# Patient Record
Sex: MALE | Race: WHITE | NOT HISPANIC OR LATINO | Employment: OTHER | URBAN - METROPOLITAN AREA
[De-identification: names, ages, dates, MRNs, and addresses within clinical notes are randomized per-mention and may not be internally consistent; named-entity substitution may affect disease eponyms.]

---

## 2019-10-04 LAB
APPEARANCE, UA: ABNORMAL
BACTERIA #/AREA URNS AUTO: ABNORMAL /HPF
BILIRUB UR QL STRIP: NEGATIVE
BUN SERPL-MCNC: 31 MG/DL (ref 7–18)
CALCIUM SERPL-MCNC: 9.2 MG/DL (ref 8.5–10.1)
CHLORIDE SERPL-SCNC: 102 MMOL/L (ref 98–107)
CO2 SERPL-SCNC: 29 MMOL/L (ref 21–32)
COLOR UR: YELLOW
CREAT SERPL-MCNC: 1.69 MG/DL (ref 0.7–1.3)
CREAT/UREA NIT SERPL: 18.3
ERYTHROCYTE [DISTWIDTH] IN BLOOD BY AUTOMATED COUNT: 13.4 % (ref 11.5–17)
GLUCOSE (UA): NEGATIVE
GLUCOSE SERPL-MCNC: 194 MG/DL (ref 74–106)
HCT VFR BLD AUTO: 36.4 % (ref 42–52)
HGB BLD-MCNC: 11.9 GM/DL (ref 14–18)
HGB UR QL STRIP: ABNORMAL
KETONES UR QL STRIP: ABNORMAL
LEUKOCYTE ESTERASE UR QL STRIP: ABNORMAL
MCH RBC QN AUTO: 30.5 PG (ref 27–31)
MCHC RBC AUTO-ENTMCNC: 32.7 GM/DL (ref 33–36)
MCV RBC AUTO: 93.3 FL (ref 80–94)
NITRITE UR QL STRIP.AUTO: NEGATIVE
PH UR STRIP: 5.5 [PH] (ref 5–9)
PLATELET # BLD AUTO: 188 X10(3)/MCL (ref 130–400)
PMV BLD AUTO: 9.7 FL (ref 9.4–12.4)
POTASSIUM SERPL-SCNC: 4.5 MMOL/L (ref 3.5–5.1)
PROT UR QL STRIP: ABNORMAL
RBC # BLD AUTO: 3.9 X10(6)/MCL (ref 4.7–6.1)
RBC #/AREA URNS HPF: 493 /HPF (ref 0–2)
SODIUM SERPL-SCNC: 138 MMOL/L (ref 136–145)
SP GR UR STRIP: 1.02 (ref 1–1.03)
SQUAMOUS EPITHELIAL, UA: ABNORMAL
UROBILINOGEN UR STRIP-ACNC: 0.2
WBC # SPEC AUTO: 4.9 X10(3)/MCL (ref 4.5–11.5)
WBC #/AREA URNS AUTO: 108 /HPF (ref 0–3)

## 2019-10-14 ENCOUNTER — HISTORICAL (OUTPATIENT)
Dept: ADMINISTRATIVE | Facility: HOSPITAL | Age: 79
End: 2019-10-14

## 2019-10-14 LAB
INR PPP: 1.1 (ref 0–1.3)
PROTHROMBIN TIME: 13.9 SECOND(S) (ref 12–14)

## 2020-05-25 LAB
APTT PPP: 30.8 SECOND(S) (ref 23.2–33.7)
BUN SERPL-MCNC: 28.3 MG/DL (ref 8.4–25.7)
CALCIUM SERPL-MCNC: 8.3 MG/DL (ref 8.8–10)
CHLORIDE SERPL-SCNC: 104 MMOL/L (ref 98–107)
CO2 SERPL-SCNC: 28 MMOL/L (ref 23–31)
CREAT SERPL-MCNC: 1.82 MG/DL (ref 0.73–1.18)
CREAT/UREA NIT SERPL: 16
ERYTHROCYTE [DISTWIDTH] IN BLOOD BY AUTOMATED COUNT: 13 % (ref 11.5–17)
GLUCOSE SERPL-MCNC: 144 MG/DL (ref 82–115)
HCT VFR BLD AUTO: 36.2 % (ref 42–52)
HGB BLD-MCNC: 11.6 GM/DL (ref 14–18)
INR PPP: 1.7 (ref 0–1.3)
MCH RBC QN AUTO: 30 PG (ref 27–31)
MCHC RBC AUTO-ENTMCNC: 32 GM/DL (ref 33–36)
MCV RBC AUTO: 93.5 FL (ref 80–94)
PLATELET # BLD AUTO: 158 X10(3)/MCL (ref 130–400)
PMV BLD AUTO: 9.4 FL (ref 9.4–12.4)
POTASSIUM SERPL-SCNC: 4.5 MMOL/L (ref 3.5–5.1)
PROTHROMBIN TIME: 19.6 SECOND(S) (ref 11.1–13.7)
RBC # BLD AUTO: 3.87 X10(6)/MCL (ref 4.7–6.1)
SARS-COV-2 RNA RESP QL NAA+PROBE: NOT DETECTED
SODIUM SERPL-SCNC: 138 MMOL/L (ref 136–145)
WBC # SPEC AUTO: 4.2 X10(3)/MCL (ref 4.5–11.5)

## 2020-05-28 ENCOUNTER — HOSPITAL ENCOUNTER (OUTPATIENT)
Dept: MEDSURG UNIT | Facility: HOSPITAL | Age: 80
End: 2020-05-29
Attending: UROLOGY | Admitting: UROLOGY

## 2020-05-28 LAB — GROUP & RH: NORMAL

## 2020-05-29 LAB
ABS NEUT (OLG): 8.31 X10(3)/MCL (ref 2.1–9.2)
BASOPHILS # BLD AUTO: 0 X10(3)/MCL (ref 0–0.2)
BASOPHILS NFR BLD AUTO: 0 %
BUN SERPL-MCNC: 14.5 MG/DL (ref 8.4–25.7)
CALCIUM SERPL-MCNC: 8.4 MG/DL (ref 8.8–10)
CHLORIDE SERPL-SCNC: 102 MMOL/L (ref 98–107)
CO2 SERPL-SCNC: 29 MMOL/L (ref 23–31)
CREAT SERPL-MCNC: 1.29 MG/DL (ref 0.73–1.18)
CREAT/UREA NIT SERPL: 11
EOSINOPHIL # BLD AUTO: 0 X10(3)/MCL (ref 0–0.9)
EOSINOPHIL NFR BLD AUTO: 0 %
ERYTHROCYTE [DISTWIDTH] IN BLOOD BY AUTOMATED COUNT: 12.7 % (ref 11.5–17)
GLUCOSE SERPL-MCNC: 138 MG/DL (ref 82–115)
HCT VFR BLD AUTO: 36.8 % (ref 42–52)
HGB BLD-MCNC: 12 GM/DL (ref 14–18)
LYMPHOCYTES # BLD AUTO: 1.2 X10(3)/MCL (ref 0.6–4.6)
LYMPHOCYTES NFR BLD AUTO: 11 %
MCH RBC QN AUTO: 30.3 PG (ref 27–31)
MCHC RBC AUTO-ENTMCNC: 32.6 GM/DL (ref 33–36)
MCV RBC AUTO: 92.9 FL (ref 80–94)
MONOCYTES # BLD AUTO: 1 X10(3)/MCL (ref 0.1–1.3)
MONOCYTES NFR BLD AUTO: 9 %
NEUTROPHILS # BLD AUTO: 8.31 X10(3)/MCL (ref 2.1–9.2)
NEUTROPHILS NFR BLD AUTO: 79 %
PLATELET # BLD AUTO: 188 X10(3)/MCL (ref 130–400)
PMV BLD AUTO: 10 FL (ref 9.4–12.4)
POTASSIUM SERPL-SCNC: 4.1 MMOL/L (ref 3.5–5.1)
RBC # BLD AUTO: 3.96 X10(6)/MCL (ref 4.7–6.1)
SODIUM SERPL-SCNC: 137 MMOL/L (ref 136–145)
WBC # SPEC AUTO: 10.5 X10(3)/MCL (ref 4.5–11.5)

## 2020-09-17 ENCOUNTER — HOSPITAL ENCOUNTER (OUTPATIENT)
Dept: MEDSURG UNIT | Facility: HOSPITAL | Age: 80
End: 2020-09-18
Attending: INTERNAL MEDICINE | Admitting: INTERNAL MEDICINE

## 2020-09-17 LAB
ABS NEUT (OLG): 8.03 X10(3)/MCL (ref 2.1–9.2)
ALBUMIN SERPL-MCNC: 4 GM/DL (ref 3.4–4.8)
ALBUMIN/GLOB SERPL: 1.3 RATIO (ref 1.1–2)
ALP SERPL-CCNC: 41 UNIT/L (ref 40–150)
ALT SERPL-CCNC: 5 UNIT/L (ref 0–55)
APPEARANCE, UA: CLEAR
APTT PPP: 31.3 SECOND(S) (ref 23.2–33.7)
AST SERPL-CCNC: 21 UNIT/L (ref 5–34)
BACTERIA SPEC CULT: ABNORMAL /HPF
BASOPHILS # BLD AUTO: 0 X10(3)/MCL (ref 0–0.2)
BASOPHILS NFR BLD AUTO: 0 %
BILIRUB SERPL-MCNC: 0.7 MG/DL
BILIRUB UR QL STRIP: NEGATIVE
BILIRUBIN DIRECT+TOT PNL SERPL-MCNC: 0.3 MG/DL (ref 0–0.5)
BILIRUBIN DIRECT+TOT PNL SERPL-MCNC: 0.4 MG/DL (ref 0–0.8)
BUN SERPL-MCNC: 31.3 MG/DL (ref 8.4–25.7)
CALCIUM SERPL-MCNC: 9 MG/DL (ref 8.8–10)
CHLORIDE SERPL-SCNC: 103 MMOL/L (ref 98–107)
CO2 SERPL-SCNC: 27 MMOL/L (ref 23–31)
COLOR UR: YELLOW
CREAT SERPL-MCNC: 2.45 MG/DL (ref 0.73–1.18)
EOSINOPHIL # BLD AUTO: 0.1 X10(3)/MCL (ref 0–0.9)
EOSINOPHIL NFR BLD AUTO: 0 %
ERYTHROCYTE [DISTWIDTH] IN BLOOD BY AUTOMATED COUNT: 13.3 % (ref 11.5–17)
GLOBULIN SER-MCNC: 3.1 GM/DL (ref 2.4–3.5)
GLUCOSE (UA): ABNORMAL
GLUCOSE SERPL-MCNC: 176 MG/DL (ref 82–115)
GROUP & RH: NORMAL
HCT VFR BLD AUTO: 35.8 % (ref 42–52)
HCT VFR BLD AUTO: 38.7 % (ref 42–52)
HGB BLD-MCNC: 11.6 GM/DL (ref 14–18)
HGB BLD-MCNC: 12.4 GM/DL (ref 14–18)
HGB UR QL STRIP: ABNORMAL
INR PPP: 2 (ref 0–1.3)
KETONES UR QL STRIP: ABNORMAL
LEUKOCYTE ESTERASE UR QL STRIP: ABNORMAL
LYMPHOCYTES # BLD AUTO: 1.8 X10(3)/MCL (ref 0.6–4.6)
LYMPHOCYTES NFR BLD AUTO: 16 %
MCH RBC QN AUTO: 29.4 PG (ref 27–31)
MCHC RBC AUTO-ENTMCNC: 32 GM/DL (ref 33–36)
MCV RBC AUTO: 91.7 FL (ref 80–94)
MONOCYTES # BLD AUTO: 1.1 X10(3)/MCL (ref 0.1–1.3)
MONOCYTES NFR BLD AUTO: 10 %
NEUTROPHILS # BLD AUTO: 8.03 X10(3)/MCL (ref 2.1–9.2)
NEUTROPHILS NFR BLD AUTO: 73 %
NITRITE UR QL STRIP: NEGATIVE
PH UR STRIP: 5 [PH] (ref 5–9)
PLATELET # BLD AUTO: 219 X10(3)/MCL (ref 130–400)
PMV BLD AUTO: 9.5 FL (ref 9.4–12.4)
POC TROPONIN: 0.01 NG/ML (ref 0–0.08)
POTASSIUM SERPL-SCNC: 4 MMOL/L (ref 3.5–5.1)
PRODUCT READY: NORMAL
PROT SERPL-MCNC: 7.1 GM/DL (ref 5.8–7.6)
PROT UR QL STRIP: NEGATIVE
PROTHROMBIN TIME: 21.9 SECOND(S) (ref 11.1–13.7)
RBC # BLD AUTO: 4.22 X10(6)/MCL (ref 4.7–6.1)
RBC #/AREA URNS HPF: 9 /HPF (ref 0–2)
SODIUM SERPL-SCNC: 136 MMOL/L (ref 136–145)
SP GR UR STRIP: 1.01 (ref 1–1.03)
SQUAMOUS EPITHELIAL, UA: ABNORMAL
TROPONIN I SERPL-MCNC: 0.02 NG/ML (ref 0–0.04)
UROBILINOGEN UR STRIP-ACNC: 0.2
WBC # SPEC AUTO: 11 X10(3)/MCL (ref 4.5–11.5)
WBC #/AREA URNS HPF: ABNORMAL /HPF

## 2020-09-18 LAB
ABS NEUT (OLG): 2.12 X10(3)/MCL (ref 2.1–9.2)
BASOPHILS # BLD AUTO: 0 X10(3)/MCL (ref 0–0.2)
BASOPHILS NFR BLD AUTO: 1 %
BUN SERPL-MCNC: 23.5 MG/DL (ref 8.4–25.7)
CALCIUM SERPL-MCNC: 7.9 MG/DL (ref 8.8–10)
CHLORIDE SERPL-SCNC: 108 MMOL/L (ref 98–107)
CO2 SERPL-SCNC: 27 MMOL/L (ref 23–31)
CREAT SERPL-MCNC: 1.78 MG/DL (ref 0.73–1.18)
CREAT/UREA NIT SERPL: 13
EOSINOPHIL # BLD AUTO: 0.2 X10(3)/MCL (ref 0–0.9)
EOSINOPHIL NFR BLD AUTO: 3 %
ERYTHROCYTE [DISTWIDTH] IN BLOOD BY AUTOMATED COUNT: 13.2 % (ref 11.5–17)
GLUCOSE SERPL-MCNC: 154 MG/DL (ref 82–115)
HCT VFR BLD AUTO: 33.9 % (ref 42–52)
HGB BLD-MCNC: 10.9 GM/DL (ref 14–18)
LYMPHOCYTES # BLD AUTO: 2.1 X10(3)/MCL (ref 0.6–4.6)
LYMPHOCYTES NFR BLD AUTO: 42 %
MCH RBC QN AUTO: 29.9 PG (ref 27–31)
MCHC RBC AUTO-ENTMCNC: 32.2 GM/DL (ref 33–36)
MCV RBC AUTO: 93.1 FL (ref 80–94)
MONOCYTES # BLD AUTO: 0.5 X10(3)/MCL (ref 0.1–1.3)
MONOCYTES NFR BLD AUTO: 11 %
NEUTROPHILS # BLD AUTO: 2.12 X10(3)/MCL (ref 2.1–9.2)
NEUTROPHILS NFR BLD AUTO: 43 %
PLATELET # BLD AUTO: 181 X10(3)/MCL (ref 130–400)
PMV BLD AUTO: 9.3 FL (ref 9.4–12.4)
POTASSIUM SERPL-SCNC: 4 MMOL/L (ref 3.5–5.1)
RBC # BLD AUTO: 3.64 X10(6)/MCL (ref 4.7–6.1)
SODIUM SERPL-SCNC: 140 MMOL/L (ref 136–145)
WBC # SPEC AUTO: 5 X10(3)/MCL (ref 4.5–11.5)

## 2022-04-30 NOTE — ED PROVIDER NOTES
Patient:   Homar David            MRN: [id number]            FIN: [id number]               Age:   79 years     Sex:  Male     :  1940   Associated Diagnoses:   Lower GI bleed; Acute hypotension; Anticoagulated on Coumadin   Author:   Sammie DUARTE MD, Hebert OLSEN      Basic Information   Time seen: Date & time 2020 15:36:00.   History source: Patient.   Arrival mode: Private vehicle, walking.   History limitation: None.   Additional information: [facility]      History of Present Illness   The patient presents with Patient states weakness and low BP starting this afternoon (joni, GARRY).    I, Dr. Cochran, assumed care of this patient at 1545.    78 y/o male presents to ED with generalized weakness and is hypotensive. Pt's family reports when he got out of the vehicle today he was off-balanced. Pt reports having a rectal bleed from hemorrhoids that started 3 days ago and diarrhea that started yesterday. Pt denies fever, chills, chest pain, SOB, N/V, and leg pain or swelling..  The onset was today.  The course/duration of symptoms is constant.  The character of symptoms is generalized.  The degree at present is minimal.  Risk factors consist of none.  Prior episodes: none.  Therapy today: none.  Associated symptoms: diarrhea, blood in stool, denies chest pain, denies nausea, denies vomiting, denies shortness of breath, denies fever and denies chills.  Additional history: none.        Review of Systems   Constitutional symptoms:  Weakness, fatigue, no fever, no chills, no sweats.    Skin symptoms:  No rash,    Eye symptoms:  No recent vision problems   ENMT symptoms:  No ear pain,    Respiratory symptoms:  No shortness of breath, no orthopnea   Cardiovascular symptoms:  No chest pain, no palpitations   Gastrointestinal symptoms:  Diarrhea, rectal bleeding, no abdominal pain, no nausea, no vomiting   Genitourinary symptoms:  No dysuria, no hematuria.    Musculoskeletal  symptoms:  Reports, denies leg pain/swellingNo back pain, no Muscle pain.    Psychiatric symptoms:  No anxiety, no depression.    Allergy/immunologic symptoms:  No seasonal allergies, no food allergies             Additional review of systems information: All other systems reviewed and otherwise negative.      Health Status   Allergies:    Allergic Reactions (Selected)  Severity Not Documented  Morphine- No reactions were documented..   Medications:  (Selected)   Inpatient Medications  Ordered  Normal Saline (0.9% NS) IV 1,000 mL: 1,000 mL, 1,000 mL, IV, 1,000 mL/hr, start date 20 15:37:00 CDT  Protonix: 40 mg, form: Injection, IV Piggyback, Daily, first dose 20 15:53:00 CDT, STAT  Zofran ODT 4 mg oral tablet, disintegratin mg, form: Tab-Dis, Oral, Once, first dose 20 15:00:00 CDT, stop date 20 15:00:00 CDT  Prescriptions  Prescribed  Mobic 7.5 mg oral tablet: 7.5 mg = 1 tab(s), Oral, BID, # 20 tab(s), 0 Refill(s), Pharmacy: [facility] [id number]  Documented Medications  Documented  Artificial Tears ophthalmic solution: 1 drop(s) =, Eye-Right, 5x/Day  Coumadin: 4 mg, Oral, take two tabs Mon through Friday  and 2.5 on Sat and Sun, 0 Refill(s)  Hydrocortisone 1% In Absorbase topical ointment: 1 cori, TOP, BID  Norco 10 mg-325 mg oral tablet: 1 tab(s), Oral, q4hr, PRN PRN for pain, 0 Refill(s)  Xanax 0.5 mg oral tablet: 0.5 mg = 1 tab(s), Oral, At Bedtime  alPRAzolam 0.5 mg oral tablet: 0.5 mg = 1 tab(s), Oral, TID, PRN PRN anxiety  for anxiety  amoxicillin 500 mg oral capsule: 500 mg = 1 cap(s), Oral, TID, # 21 cap(s), 0 Refill(s)  aspirin 81 mg oral tablet: 81 mg = 1 tab(s), Oral, Daily  atorvastatin 80 mg oral tablet: 80 mg = 1 tab(s), Oral, Daily  carbidopa-levodopa 25 mg-100 mg oral tablet: 2 tab(s), Oral, TID  carbidopa-levodopa 50 mg-200 mg oral tablet, extended release: 1 tab(s), Oral, BID, # 90 tab(s), 0 Refill(s)  carboxymethylcellulose: 1 drop(s), Eye-Both,  q4hr  cyclobenzaprine 10 mg oral tablet: 10 mg = 1 tab(s), Oral, TID, PRN PRN for spasm  dexamethasone-tobramycin 0.1%-0.3% ophthalmic ointment: See Instructions, apply thin line both eyes at bedtime  docusate calcium 240 mg oral capsule: 240 mg = 1 cap(s), Oral, BID  donepezil 10 mg oral tablet: 10 mg = 1 tab(s), Oral, Once a day (at bedtime)  fenofibrate 48 mg oral tablet: 48 mg = 1 tab(s), Oral, Daily, # 30 tab(s), 0 Refill(s)  fluticasone 50 mcg/inh nasal spray: 2 spray(s), Nasal, Daily  gabapentin 300 mg oral capsule: 300 mg = 1 cap(s), Oral, BID  glipiZIDE 5 mg oral tablet, extended release (XL tab): 5 mg = 1 tab(s), Oral, Daily  lisinopril 2.5 mg oral tablet: 2.5 mg = 1 tab(s), Oral, Daily  loratadine 10 mg oral capsule: 10 mg = 1 cap(s), Oral, Daily  meclizine 12.5 mg oral tablet: 12.5 mg = 1 tab(s), Oral, TID, PRN PRN for dizziness  metoprolol tartrate 50 mg oral tab: 25 mg = 0.5 tab(s), Oral, Daily  multivitamin with minerals (Adult Tab): 1 tab(s), Oral, Daily  omeprazole 20 mg oral DR capsule: 20 mg = 1 cap(s), Oral, Daily  omeprazole 20 mg oral DR capsule: 40 mg = 2 cap(s), Oral, Daily  pregabalin 50 mg oral capsule: 50 mg = 1 cap(s), Oral, BID, 0 Refill(s)  selenium sulfide 2.3% topical shampoo: 1 cori, TOP, Daily  sodium chloride, hypertonic 5% ophthalmic solution: 1 drop(s), Eye-Right, BID  sulfamethoxazole-trimethoprim 800 mg-160 mg oral tablet: 0.5 tab(s), Oral, Once a day (at bedtime)  sulfamethoxazole-trimethoprim 800 mg-160 mg oral tablet: 1 tab(s), Oral, Daily, # 30 tab(s), 0 Refill(s)  tamsulosin 0.4 mg oral capsule: 0.4 mg = 1 cap(s), Oral, Daily, 0 Refill(s)  traZODONE 150 mg oral tab ( Desyrel ): 300 mg = 2 tab(s), Oral, Once a day (at bedtime).   Immunizations: Up to date.      Past Medical/ Family/ Social History   Medical history:    No active or resolved past medical history items have been selected or recorded., DM,HTN,.   Surgical history:    Transurethral Resection Prostate (.) on  5/28/2020 at 79 Years.  Comments:  5/28/2020 13:06 Olga Lidia Yoon RN  auto-populated from documented surgical case  Cystoscopy (.) on 5/28/2020 at 79 Years.  Comments:  5/28/2020 13:06 Olga Lidia Yoon RN  auto-populated from documented surgical case  Transurethral Resection Bladder Tumor (.) on 10/14/2019 at 78 Years.  Comments:  10/14/2019 13:32 TYSONT Angelica Jimenez RN, Carlota SINGH  auto-populated from documented surgical case  heart valve repair.  gallbaladder removal.  neck surgery.  pacemaker/ defib.  appendectomy.  bilatteral cataract surgery.  right rotor cuff repair.  bladder scope..   Family history:    No family history items have been selected or recorded..   Social history: Tobacco use: Denies, Drug use: Denies, Occupation: Retired, Family/social situation: .      Physical Examination               Vital Signs   Vital Signs   9/17/2020 15:33 CDT      Temperature Oral          36.5 DegC                             Temperature Oral (calculated)             97.70 DegF                             Peripheral Pulse Rate     66 bpm                             SpO2                      95 %                             Oxygen Therapy            Room air                             Systolic Blood Pressure   62 mmHg  LOW                             Diastolic Blood Pressure  41 mmHg  LOW  (Modified) .   Measurements   9/17/2020 15:33 CDT      Weight Dosing             81.5 kg                             Weight Measured and Calculated in Lbs     179.67 lb                             Weight Estimated          81.5 kg                             Height/Length Dosing      182.88 cm                             Height/Length Estimated   182.88 cm                             Body Mass Index Estimated 24.37 kg/m2  .   Basic Oxygen Information   9/17/2020 16:15 CDT      SpO2                      96 %                             Oxygen Therapy            Room air  General:  Alert, mild distress   Neurological:   Alert and oriented to person, place, time, and situation, No focal neurological deficit observed, CN II-XII intact, normal sensory observed, normal motor observed, normal speech observed   Skin:  Warm, moist, no rash, pale   Head:  Normocephalic, atraumatic   , Conjunctiva: Pale. Cardiovascular:  No murmur, Normal peripheral perfusion, No edema, Tachycardia   Respiratory:  Lungs are clear to auscultation, respirations are non-labored, breath sounds are equal, Symmetrical chest wall expansion.    Musculoskeletal:  Normal ROM, normal strength   Gastrointestinal:  Soft, Nontender, Non distended, Normal bowel sounds   Lymphatics:  No lymphadenopathy.   Psychiatric:  Cooperative, appropriate mood & affect, normal judgment      Medical Decision Making   Documents reviewed:  Emergency department nurses' notes, emergency department records.    Orders  Launch Order Profile (Selected)   Inpatient Orders  Ordered  EK20 15:37:00 CDT, 20 15:37:00 CDT, Patient Bed, Patient Has IV, Standard Precautions, NOW, -1, -1, 20 15:37:00 CDT  Normal Saline (0.9% NS) IV 1,000 mL: 1,000 mL, 1,000 mL, IV, 1,000 mL/hr, start date 20 15:37:00 CDT  Protonix: 40 mg, form: Injection, IV Piggyback, Daily, first dose 20 15:53:00 CDT, STAT  Type and Crossmatch 1st order: 20 15:52:00 CDT, Stat collect, Blood, Lab Collect, Packed RBC, To Keep Ahead, 2, 20, Print Label By Order Location, 20 15:52:00 CDT  Ordered (Collected)  POC iSTAT ER Troponin request: BLOOD, STAT collect, Collected, 20 15:55:39 CDT, Stop date 20 15:55:00 CDT, Lab Collect, Print Label By Order Location  Ordered (Dispatched)  UA with Reflex: Stat collect, Urine, 20 15:37:00 CDT, Stop date 20 15:38:00 CDT, Nurse collect, Print Label By Order Location  Ordered (Exam Ordered)  XR Chest 1 View: Stat, 20 15:38:00 CDT, Cough, None, Portable, Patient Has IV?, Rad Type, Not Scheduled, 20 15:38:00  CDT  Ordered (In-Lab)  Automated Diff: NOW collect, 09/17/20 15:55:00 CDT, Blood, Collected, Stop date 09/17/20 15:55:00 CDT, Lab Collect, Print Label By Order Location, 09/17/20 15:37:00 CDT  CBC w/ Auto Diff: NOW collect, 09/17/20 15:55:39 CDT, BLOOD, Collected, Stop date 09/17/20 15:55:00 CDT, Lab Collect  CMP: STAT collect, 09/17/20 15:55:39 CDT, BLOOD, Collected, Stop date 09/17/20 15:55:00 CDT, Lab Collect  INR - Protime: STAT collect, 09/17/20 15:55:39 CDT, BLOOD, Collected, Stop date 09/17/20 15:55:00 CDT, Lab Collect  PTT: STAT collect, 09/17/20 15:55:39 CDT, BLOOD, Collected, Stop date 09/17/20 15:55:00 CDT, Lab Collect  Troponin-I: STAT collect, 09/17/20 15:55:39 CDT, BLOOD, Collected, Stop date 09/17/20 15:38:00 CDT, Lab Collect  Completed  POC CBG: Blood, Stat collect, Collected, 09/17/20 15:37:18 CDT.   Results review:  Lab results : [facility]   9/17/2020 15:55 CDT      WBC                       11.0 x10(3)/mcL                             RBC                       4.22 x10(6)/mcL  LOW                             Hgb                       12.4 gm/dL  LOW                             Hct                       38.7 %  LOW                             Platelet                  219 x10(3)/mcL                             MCV                       91.7 fL                             MCH                       29.4 pg                             MCHC                      32.0 gm/dL  LOW                             RDW                       13.3 %                             MPV                       9.5 fL                             Abs Neut                  8.03 x10(3)/mcL                             Neutro Auto               73 %  NA                             Lymph Auto                16 %  NA                             Mono Auto                 10 %  NA                             Eos Auto                  0 %  NA                             Abs Eos                   0.1 x10(3)/mcL                              Basophil Auto             0 %  NA                             Abs Neutro                8.03 x10(3)/mcL                             Abs Lymph                 1.8 x10(3)/mcL                             Abs Mono                  1.1 x10(3)/mcL                             Abs Baso                  0.0 x10(3)/mcL  , Lab results : [facility]   9/17/2020 16:13 CDT      POC Troponin              0.01 ng/mL    9/17/2020 15:55 CDT      Sodium Lvl                136 mmol/L                             Potassium Lvl             4.0 mmol/L                             Chloride                  103 mmol/L                             CO2                       27 mmol/L                             Calcium Lvl               9.0 mg/dL                             Glucose Lvl               176 mg/dL  HI                             BUN                       31.3 mg/dL  HI                             Creatinine                2.45 mg/dL  HI                             eGFR-AA                   33  NA                             eGFR-GLADIS                  27  NA                             Bili Total                0.7 mg/dL                             Bili Direct               0.3 mg/dL                             Bili Indirect             0.40 mg/dL                             AST                       21 unit/L                             ALT                       5 unit/L                             Alk Phos                  41 unit/L                             Total Protein             7.1 gm/dL                             Albumin Lvl               4.0 gm/dL                             Globulin                  3.1 gm/dL                             A/G Ratio                 1.3 ratio                             Troponin-I                0.019 ng/mL                             PT                        21.9 second(s)  HI                             INR                       2.0  HI                             PTT                        31.3 second(s)                             WBC                       11.0 x10(3)/mcL                             RBC                       4.22 x10(6)/mcL  LOW                             Hgb                       12.4 gm/dL  LOW                             Hct                       38.7 %  LOW                             Platelet                  219 x10(3)/mcL                             MCV                       91.7 fL                             MCH                       29.4 pg                             MCHC                      32.0 gm/dL  LOW                             RDW                       13.3 %                             MPV                       9.5 fL                             Abs Neut                  8.03 x10(3)/mcL                             Neutro Auto               73 %  NA                             Lymph Auto                16 %  NA                             Mono Auto                 10 %  NA                             Eos Auto                  0 %  NA                             Abs Eos                   0.1 x10(3)/mcL                             Basophil Auto             0 %  NA                             Abs Neutro                8.03 x10(3)/mcL                             Abs Lymph                 1.8 x10(3)/mcL                             Abs Mono                  1.1 x10(3)/mcL                             Abs Baso                  0.0 x10(3)/mcL    9/17/2020 15:53 CDT      ABO/Rh                    O NEG                             Antibody Screen           Neg                             Product Ready             2 RBC ready  .       Reexamination/ Reevaluation   Time: 9/17/2020 17:17:00 .   Interventions: A should presented hypotensive and globally weak after small fluid bolus patient normal tensive and remained so during emergency room stay patient with H&H a noncritical range patient does have an INR 2 but is on Coumadin for supposed artificial heart valve so we'll not  do acute reversal since patient stabilized discussed case with GI and with hospitalist will admit.      Impression and Plan   Diagnosis   Lower GI bleed (BNG40-PT K92.2)   Acute hypotension (QFI22-GI I95.9)   Anticoagulated on Coumadin (EVV41-XL Z79.01)      Calls-Consults   -  Nelson-admit to [facility]- will see in consult.   Plan   Condition: Improved, Stable.    Disposition: Admit time  9/17/2020 17:20:00, Admit to Inpatient Unit.    Counseled: Patient, Family, Regarding diagnosis, Regarding diagnostic results, Regarding treatment plan, Regarding prescription, Patient indicated understanding of instructions.    Notes: I, Nisha Aguilar, acted solely as a scribe for and in the presence of Dr. Cochran who performed the service., I, Hannah Peck, acted solely as a scribe for and in the presence of Dr. Cochran who performed the service.   ,       This scribes note accurately reflects the work done by me I have reviewed the note and personally performed a history and physical and agree with all the documentation and findings.

## 2022-04-30 NOTE — OP NOTE
Patient:   Homar David            MRN: [id number]            FIN: [id number]               Age:   79 years     Sex:  Male     :  1940   Associated Diagnoses:   None   Author:   Raul Guillen MD      PREOPERATIVE DIAGNOSIS(ES):  BPH with bladder outlet obstruction.    POSTOPERATIVE DIAGNOSIS(ES):  BPH with bladder outlet obstruction.    PROCEDURE:  Transurethral resection of prostate (Gyrus Superpulse bipolar TURP).    SURGEON:  Raul Guillen MD    ANESTHESIA:  General.    FLUIDS:  1000mL.    ESTIMATED BLOOD LOSS:  15 mL.    SPECIMENS:  Prostate chips sent to pathology.    FINDINGS:  Trilobar hypertrophy of prostate,    DRAINS:  An 24-Russian 3 way Muller catheter in the bladder, with 20 mL sterile water in balloon, to saline  CBI by gravity, and gravity drainage.    COMPLICATIONS:  None.    SUMMARY:  After informed consent was obtained, the patient brought to the operating room and placed in  supine position. After adequate general anesthetic, they were positioned in dorsal lithotomy.  The genitalia was prepped and draped sterile manner. A time-out was performed and the  patient identified using 2 identifiers and procedure site was verified. A 21-Russian rigid  cystoscope was advanced into the bladder. The bladder was drained and examined thoroughly  with 30- and 70- degree lenses. The urethra was unremarkable. The above-mentioned findings  were seen. The ureteral orifices were in the normal position with clear efflux bilaterally. The  cystoscope was removed. We then placed a 26-Russian continuous flow resectoscope  sheath and visual obturator into the bladder without difficulty. The Gyrus SuperPulse  resectoscope was then used to perform transurethral resection of prostate in standard fashion.  We resected the median lobe first, and then took down the left lateral lobe. We resected from 6 o'clock   anterioly towards 12 o'clock from bladder neck to verumontanum. The right lateral lobe  was  then taken down in a similar manner. Care was taken to avoid resecting proximal to the  bladder neck or distal to the verumontanum. The prostate was completely resected to the  surgical capsule and all chips were evacuated with Urovac evacuator. These were sent to  pathology for permanent section. Hemostasis was achieved with a pinpoint electrocautery.  There was minimal bleeding. There were no chips left in the bladder, and the ureteral orifices  and verumontanum were intact. The scope was removed. The patient tolerated the procedure  well. The patient was extubated and transported to the recovery room stable condition.    PLAN:  CBI in PACU, reasess in Day Surgery   observation overnight for CBI due to hematuria

## 2022-05-02 NOTE — HISTORICAL [facility] CERNER
This is a historical note converted from [employer]. Formatting and pictures may have been removed.  Please reference [employer] for original formatting and attached multimedia. Chief Complaint  [facility]. Hypotensive in triage. Hx of PM, DM, a-fib on coumadin.   History of Present Illness  Patient is an 79 year male with past medical history of paroxysmal atrial fibrillation on chronic anticoagulation with Coumadin, status post pacemaker placement, coronary artery disease, Parkinsons disease, GERD, diabetes and hyperlipidemia who presented to the ER after being dizzy while getting outpatient laboratory work by his primary care physician.? Patient reportedly was in his usual state of health today but began feeling lightheaded and dizzy when he was waiting for laboratory work, denied chest pain, fever cough, chills or shortness of breath at this time.? They asked that he go to the ER for further evaluation was found to be hypotensive, and given a normal saline bolus with stabilization and improvement of his blood pressure since.? Laboratory work was significant for stable hemoglobin of 12.4 as compared to prior, a therapeutic INR, and essentially unremarkable laboratory work.? EKG showed a ventricular paced rhythm and cardiac enzymes were within normal limits.? Patient reported he is having a small amount of blood but has known hemorrhoids, over the last few days consistent with prior episode of hemorrhoidal bleeding.? He is being admitted to the hospitalist service for hypotension, mild hematochezia, in the setting of anticoagulation.  Review of Systems  Except as documented, all other ROS reviewed and negative  Physical Exam  Vitals & Measurements  T:?36.5? ?C (Oral)? HR:?66(Peripheral)? RR:?15? BP:?143/81? SpO2:?96%? WT:?81.5?kg? BMI:?24.22?  GENERAL: awake, alert, oriented and in no acute distress  HEENT: normocephalic atraumatic?  NECK: supple?  LUNGS: CTA B/L, no rales or rhonchi,  moving air well without resp distress  CVS: Regular rate and rhythm, normal peripheral perfusion  ABD: Soft, non-tender, non-distended, bowel sounds present  EXTREMITIES: no clubbing or cyanosis  SKIN: Warm, dry.?  NEURO: alert and oriented, grossly without focal deficits?  PSYCHIATRIC: Cooperative  ?  Assessment/Plan  Hypotension, resolved with IV fluids  Mild hematochezia likely from hemorrhoids  Chronic anticoagulation due to afib  Hyperglycemia in the setting of?NIDDM  ?  - Patient currently asymptomatic, symptoms of resolved with?2 L IV fluid bolus.? His required no further IV fluids with normal blood pressure since.?  - Monitor H&H,?and hemodynamics  - If drop in H&H, consult GI  - No systemic signs of infectious process at this  - Continue home medications as appropriate, hold antihypertensives tonight  ?  DVT prophylaxis: on cumadin  CODE STATUS: Full code  [facility]  ?   Problem List/Past Medical History  Ongoing  Acid reflux  Atrial fibrillation  Diabetes mellitus  Hyperlipidemia  MI - myocardial infarction  Parkinsons disease  Urinary tract infection  Procedure/Surgical History  Cystoscopy (.) (05/28/2020)  Resection of Prostate, Via Natural or Artificial Opening Endoscopic (05/28/2020)  Excision of Bladder, Via Natural or Artificial Opening Endoscopic (10/14/2019)  Transurethral Resection Bladder Tumor (.) (10/14/2019)  appendectomy  bilatteral cataract surgery  bladder scope  gallbaladder removal  heart valve repair  neck surgery  pacemaker/ defib  right rotor cuff repair   Medications  Inpatient  acetaminophen, 650 mg= 20.3 mL, Oral, q6hr, PRN  acetaminophen, 1000 mg= 2 tab(s), Oral, q6hr, PRN  Dextrose 50% and Water (50 mL vial/syringe), 25 mL, IV Push, Once, PRN  Dextrose 50% and Water (50 mL vial/syringe), 25 mL, IV Push, As Directed, PRN  Dextrose 50% and Water (50 mL vial/syringe), 50 mL, IV Push, As Directed, PRN  Dextrose 50% in Water intravenous solution, 25 mL, IV Push, As Directed,  PRN  glucagon, 1 mg= 1 EA, IM, q10min, PRN  glucagon, 1 mg= 1 EA, IM, q10min, PRN  insulin lispro, 2-14 units, Subcutaneous, As Directed, PRN  Normal Saline (0.9% NS) IV 1,000 mL, 1000 mL, IV  Protonix, 40 mg= 1 EA, IV Push, BID  Sodium Chloride 0.9% 1000mL 1,000 mL, 1000 mL, IV  Zofran ODT 4 mg oral tablet, disintegrating, 4 mg= 1 tab(s), Oral, Once  Home  alPRAzolam 0.5 mg oral tablet, 0.5 mg= 1 tab(s), Oral, TID, PRN  amoxicillin 500 mg oral capsule, 500 mg= 1 cap(s), Oral, TID  Artificial Tears ophthalmic solution, 1 drop(s), Eye-Right, 5x/Day  aspirin 81 mg oral tablet, 81 mg= 1 tab(s), Oral, Daily  atorvastatin 80 mg oral tablet, 80 mg= 1 tab(s), Oral, Daily  carbidopa-levodopa 25 mg-100 mg oral tablet, 2 tab(s), Oral, TID  carbidopa-levodopa 50 mg-200 mg oral tablet, extended release, 1 tab(s), Oral, BID  carboxymethylcellulose, 1 drop(s), Eye-Both, q4hr  Coumadin, 4 mg, Oral  cyclobenzaprine 10 mg oral tablet, 10 mg= 1 tab(s), Oral, TID, PRN  dexamethasone-tobramycin 0.1%-0.3% ophthalmic ointment, See Instructions  docusate calcium 240 mg oral capsule, 240 mg= 1 cap(s), Oral, BID  donepezil 10 mg oral tablet, 10 mg= 1 tab(s), Oral, Once a day (at bedtime)  fenofibrate 48 mg oral tablet, 48 mg= 1 tab(s), Oral, Daily  fluticasone 50 mcg/inh nasal spray, 2 spray(s), Nasal, Daily  gabapentin 300 mg oral capsule, 300 mg= 1 cap(s), Oral, BID,? ?Not Taking per Prescriber: Last Dose Date/Time Unknown  glipiZIDE 5 mg oral tablet, extended release (XL tab), 5 mg= 1 tab(s), Oral, Daily,? ?Not Taking per Prescriber: Last Dose Date/Time Unknown  Hydrocortisone 1% In Absorbase topical ointment, 1 cori, TOP, BID,? ?Still taking, not as prescribed: prn  lisinopril 2.5 mg oral tablet, 2.5 mg= 1 tab(s), Oral, Daily,? ?Not Taking per Prescriber: Last Dose Date/Time Unknown  loratadine 10 mg oral capsule, 10 mg= 1 cap(s), Oral, Daily  meclizine 12.5 mg oral tablet, 12.5 mg= 1 tab(s), Oral, TID, PRN  metoprolol tartrate 50 mg  oral tab, 25 mg= 0.5 tab(s), Oral, Daily  multivitamin with minerals (Adult Tab), 1 tab(s), Oral, Daily  Norco 10 mg-325 mg oral tablet, 1 tab(s), Oral, q4hr, PRN  omeprazole 20 mg oral DR capsule, 40 mg= 2 cap(s), Oral, Daily  omeprazole 20 mg oral DR capsule, 20 mg= 1 cap(s), Oral, Daily,? ?Not taking: Last Dose Date/Time Unknown  pregabalin 50 mg oral capsule, 50 mg= 1 cap(s), Oral, BID  selenium sulfide 2.3% topical shampoo, 1 cori, TOP, Daily  sodium chloride, hypertonic 5% ophthalmic solution, 1 drop(s), Eye-Right, BID  sulfamethoxazole-trimethoprim 800 mg-160 mg oral tablet, 1 tab(s), Oral, Daily  tamsulosin 0.4 mg oral capsule, 0.4 mg= 1 cap(s), Oral, Daily  traZODONE 150 mg oral tab ( Desyrel ), 300 mg= 2 tab(s), Oral, Once a day (at bedtime)  Xanax 0.5 mg oral tablet, 0.5 mg= 1 tab(s), Oral, At Bedtime,? ?Not taking: Last Dose Date/Time Unknown  Allergies  morphine  Social History  Alcohol - No Risk, 08/15/2015  Substance Use - No Risk, 08/15/2015  Tobacco - No Risk, 08/15/2015  Never (less than 100 in lifetime), N/A, 09/17/2020  Former smoker, quit more than 30 days ago, No, 07/24/2020  Former smoker, quit more than 30 days ago, No, 10/04/2019  Family History  Reviewed and non-contributory  Lab Results  Chemistry? Hematology/Coagulation?   Sodium Lvl: 136 mmol/L (09/17/20 15:55:00) PT:?21.9 second(s)?High (09/17/20 15:55:00)   Potassium Lvl: 4 mmol/L (09/17/20 15:55:00) INR:?2?High (09/17/20 15:55:00)   Chloride: 103 mmol/L (09/17/20 15:55:00) PTT: 31.3 second(s) (09/17/20 15:55:00)   CO2: 27 mmol/L (09/17/20 15:55:00) WBC: 11 x10(3)/mcL (09/17/20 15:55:00)   Calcium Lvl: 9 mg/dL (09/17/20 15:55:00) RBC:?4.22 x10(6)/mcL?Low (09/17/20 15:55:00)   Glucose Lvl:?176 mg/dL?High (09/17/20 15:55:00) Hgb:?12.4 gm/dL?Low (09/17/20 15:55:00)   BUN:?31.3 mg/dL?High (09/17/20 15:55:00) Hct:?38.7 %?Low (09/17/20 15:55:00)   Creatinine:?2.45 mg/dL?High (09/17/20 15:55:00) Platelet: 219 x10(3)/mcL (09/17/20 15:55:00)    eGFR-AA: 33 (09/17/20 15:55:00) MCV: 91.7 fL (09/17/20 15:55:00)   eGFR-GLADIS: 27 (09/17/20 15:55:00) MCH: 29.4 pg (09/17/20 15:55:00)   Bili Total: 0.7 mg/dL (09/17/20 15:55:00) MCHC:?32 gm/dL?Low (09/17/20 15:55:00)   Bili Direct: 0.3 mg/dL (09/17/20 15:55:00) RDW: 13.3 % (09/17/20 15:55:00)   Bili Indirect: 0.4 mg/dL (09/17/20 15:55:00) MPV: 9.5 fL (09/17/20 15:55:00)   AST: 21 unit/L (09/17/20 15:55:00) Abs Neut: 8.03 x10(3)/mcL (09/17/20 15:55:00)   ALT: 5 unit/L (09/17/20 15:55:00) Neutro Auto: 73 % (09/17/20 15:55:00)   Alk Phos: 41 unit/L (09/17/20 15:55:00) Lymph Auto: 16 % (09/17/20 15:55:00)   Total Protein: 7.1 gm/dL (09/17/20 15:55:00) Mono Auto: 10 % (09/17/20 15:55:00)   Albumin Lvl: 4 gm/dL (09/17/20 15:55:00) Eos Auto: 0 % (09/17/20 15:55:00)   Globulin: 3.1 gm/dL (09/17/20 15:55:00) Abs Eos: 0.1 x10(3)/mcL (09/17/20 15:55:00)   A/G Ratio: 1.3 ratio (09/17/20 15:55:00) Basophil Auto: 0 % (09/17/20 15:55:00)   Troponin-I: 0.019 ng/mL (09/17/20 15:55:00) Abs Neutro: 8.03 x10(3)/mcL (09/17/20 15:55:00)   POC Troponin: 0.01 ng/mL (09/17/20 16:13:00) Abs Lymph: 1.8 x10(3)/mcL (09/17/20 15:55:00)    Abs Mono: 1.1 x10(3)/mcL (09/17/20 15:55:00)    Abs Baso: 0 x10(3)/mcL (09/17/20 15:55:00)   ?  Diagnostic Results  Accession:?[id number]  Order:?XR Chest 1 View  Report Dt/Tm:?09/17/2020 16:27  Report:?  PORTABLE CHEST X-RAY, ONE FRONTAL VIEW  ?  HISTORY: Cough  ?  COMPARISON: 5/25/2020  ?  FINDINGS:  Multi lead left chest cardiac device with unchanged lead projection.  ?  No focal consolidations, pleural effusions or pneumothoraces.  Prior median sternotomy. Valvular prosthesis with unchanged  positioning.  Cardiac silhouette top normal in size without overt decompensation.  Aortic knob calcifications.  No acute bony pathology.  Soft tissues within normal limits.  ?  IMPRESSION:  ?  No acute thoracic abnormality.  No significant interval change.  ?      Based on his home med rec, polypharmacy is in the  differential for hypotension as well as he is on?chronic oral opioids,?reportedly Ambien at night, trazodone,?Xanax,?and also takes Lyrica during the day. ?Well scale back on some of these tonight.

## 2022-05-02 NOTE — HISTORICAL [facility] CERNER
This is a historical note converted from [employer]. Formatting and pictures may have been removed.  Please reference [employer] for original formatting and attached multimedia. Admit and Discharge Dates  Admit Date: 09/17/2020  Discharge Date: 09/18/2020  Physicians  Attending Physician - Dottie CLINE, Jesus ROQUE  Admitting Physician - Dottie CLINE, Jesus ROQUE  Primary Care Physician - Rico CLINE, Uri M  Discharge Diagnosis  1.?Intravascular volume depletion?E86.1  2.?Chronic kidney disease (CKD), stage III (moderate)?N18.3  3.?Atrial fibrillation?I48.91  4.?Type 2 diabetes mellitus?E11.9  5.?Acute kidney injury?N17.9  6.?Anemia of chronic disease?D63.8  7.?Hemorrhoids?K64.9  Acute hypotension?I95.9  Anticoagulated on Coumadin?Z79.01  Lower GI bleed?K92.2  Weakness or fatigue?[id number]  Surgical Procedures  No procedures recorded for this visit.  Immunizations  No immunizations recorded for this visit.  Hospital Course  79 year male with past medical history of paroxysmal atrial fibrillation on chronic anticoagulation with Coumadin, status post pacemaker placement, coronary artery disease, Parkinsons disease, GERD, diabetes and hyperlipidemia who presented to the ER after being dizzy while getting outpatient laboratory work by his primary care physician.? Patient reportedly was in his usual state of health today but began feeling lightheaded and dizzy when he was waiting for laboratory work, denied chest pain, fever cough, chills or shortness of breath at this time.? They asked that he go to the ER for further evaluation was found to be hypotensive, and given a normal saline bolus with stabilization and improvement of his blood pressure since.? Laboratory work was significant for stable hemoglobin of 12.4 as compared to prior, a therapeutic INR, and essentially unremarkable laboratory work.? EKG showed a ventricular paced rhythm and cardiac enzymes were within normal limits.? Patient reported he is  having a small amount of blood but has known hemorrhoids, over the last few days consistent with prior episode of hemorrhoidal bleeding.? He is being admitted to the hospitalist service for hypotension, mild hematochezia, in the setting of anticoagulation.  H&H stable overnight. ?No signs of bleeding. ?Likely just some hemorrhoidal spotting.  Can follow-up with GI if continues to be an issue as an outpatient.  Blood pressure remained stable overnight.  We will get him up with physical therapy this morning. ?If no further dizziness and blood pressure stays good can likely go home later today.  Continue home medications.  Follow-up renal function. ?Currently off of IV fluids.  Will need close follow-up with his primary care.  Also note that he is got a history of Parkinsons disease. ?This may be contributing to some of his hypotension. ?Recommend continue to hold his antihypertensives for now until he can follow-up with his primary care.  Time Spent on discharge  Time to discharge 31 minutes  Objective  Physical Exam  General: No acute distress, Awake, Alert,Oriented x3  HEENT: PERRL, EOMI, no scleral icterus, OP clear  Respiratory:CTA bilateral, no wheezes, rales, or rhonchi  Cardiovascular:Regular rate and rhythm, no murmurs, rubs or gallops, +s1/s2,_  Gastrointestinal: soft,nontender, nondistended, +BS  Musculoskeletal: Normal range of motion, no pertinent deformity  Integumentary: clean, warm, dry, intact  Neurologic: CN 2 - 12 grossly intact, no acute deficit noted  Patient Discharge Condition  stable  Discharge Disposition  home   Discharge Medication Reconciliation  Continue  acetaminophen-HYDROcodone (Norco 10 mg-325 mg oral tablet)?1 tab(s), Oral, q4hr, PRN for pain  alPRAzolam (Xanax 0.5 mg oral tablet)?0.5 mg, Oral, At Bedtime  alPRAzolam (alPRAzolam 0.5 mg oral tablet)?0.5 mg, Oral, TID, PRN anxiety  for anxiety  aspirin (aspirin 81 mg oral tablet)?81 mg, Oral, Daily  atorvastatin (atorvastatin 80 mg oral  tablet)?80 mg, Oral, Daily  carbidopa-levodopa (carbidopa-levodopa 25 mg-100 mg oral tablet)?2 tab(s), Oral, TID  carbidopa-levodopa (carbidopa-levodopa 50 mg-200 mg oral tablet, extended release)?1 tab(s), Oral, BID  cyclobenzaprine (cyclobenzaprine 10 mg oral tablet)?10 mg, Oral, TID, PRN for spasm  donepezil (donepezil 10 mg oral tablet)?10 mg, Oral, Once a day (at bedtime)  fenofibrate (fenofibrate 48 mg oral tablet)?48 mg, Oral, Daily  loratadine (loratadine 10 mg oral capsule)?10 mg, Oral, Daily  meclizine (meclizine 12.5 mg oral tablet)?12.5 mg, Oral, TID, PRN for dizziness  metoprolol (metoprolol tartrate 50 mg oral tab)?25 mg, Oral, Daily  multivitamin with minerals (multivitamin with minerals (Adult Tab))?1 tab(s), Oral, Daily  nitrofurantoin (nitrofurantoin macrocrystals-monohydrate 100 mg oral capsule)?100 mg, Oral, Daily  omeprazole (omeprazole 20 mg oral DR capsule)?40 mg, Oral, Daily  pregabalin (pregabalin 50 mg oral capsule)?50 mg, Oral, BID  selenium sulfide topical (selenium sulfide 2.3% topical shampoo)?1 cori, TOP, Daily  tamsulosin (tamsulosin 0.4 mg oral capsule)?0.4 mg, Oral, Daily  traZODone (traZODONE 150 mg oral tab ( Desyrel ))?300 mg, Oral, Once a day (at bedtime)  warfarin (Coumadin)?5 mg, Daily  Discontinue  acetaminophen-oxyCODONE (acetaminophen-oxycodone 325 mg-7.5 mg oral tablet)?1 tab(s), Oral, q4hr  amoxicillin (amoxicillin 500 mg oral capsule)?500 mg, Oral, TID  carboxymethylcellulose?1 drop(s), Eye-Both, q4hr  clotrimazole topical (clotrimazole 1% topical cream)?1 cori, TOP, BID  dexamethasone-tobramycin ophthalmic (dexamethasone-tobramycin 0.1%-0.3% ophthalmic ointment)?See Instructions  docusate (docusate calcium 240 mg oral capsule)?240 mg, Oral, BID  finasteride (finasteride 5 mg oral tablet)?5 mg, Oral, Daily  fluticasone nasal (fluticasone 50 mcg/inh nasal spray)?2 spray(s), Nasal, Daily  gabapentin (gabapentin 300 mg oral capsule)?300 mg, Oral, BID  glipiZIDE (glipiZIDE 5  mg oral tablet, extended release (XL tab))?5 mg, Oral, Daily  hydrocortisone topical (Hydrocortisone 1% In Absorbase topical ointment)?1 cori, TOP, BID  lisinopril (lisinopril 2.5 mg oral tablet)?2.5 mg, Oral, Daily  ocular lubricant (Artificial Tears ophthalmic solution)?1 drop(s), Eye-Right, 5x/Day  omeprazole (omeprazole 20 mg oral DR capsule)?20 mg, Oral, Daily  oxybutynin (oxybutynin 10 mg/24 hr oral tablet, extended release)?10 mg, Oral, Daily  polyethylene glycol 3350 (polyethylene glycol 3350 oral powder for reconstitution)?17 gm, Oral, Daily  sodium chloride, hypertonic, ophthalmic (sodium chloride, hypertonic 5% ophthalmic solution)?1 drop(s), Eye-Right, BID  sulfamethoxazole-trimethoprim (sulfamethoxazole-trimethoprim 800 mg-160 mg oral tablet)?1 tab(s), Oral, Daily  warfarin (Coumadin)?4 mg, Oral  Education and Orders Provided  Hypotension, Easy-to-Read  Hemorrhoids, Easy-to-Read  Dehydration, Adult, Easy-to-Read  Discharge - 09/18/20 12:58:00 CDT, Home, Give all scheduled vaccinations prior to discharge.?  Discharge Activity - Activity as Tolerated?  Discharge Diet - Diabetic  Renal Meals NOT on Dialysis?  Follow up  Uri Gómez  ????  Call for followup appointmentKeep scheduled appointment  Car Seat Challenge  No Qualifying Data

## 2022-05-02 NOTE — HISTORICAL [facility] CERNER
This is a historical note converted from [employer]. Formatting and pictures may have been removed.  Please reference [employer] for original formatting and attached multimedia. 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  Admission Information  Pt admitted for observation after TURP  due to hematuria  Significant Findings  doing well  pain controlled  urine clear off CBI  ?  Time Spent on discharge  <30min  Objective  Vitals & Measurements  T:?36.7? ?C (Oral)? TMIN:?36.5? ?C (Oral)? TMAX:?36.7? ?C (Oral)? HR:?61(Peripheral)? RR:?17? BP:?155/77? SpO2:?96%?  Physical Exam  alert  abd soft  urine clear  ?  labs and vitals stable  Patient Discharge Condition  stable  Discharge Disposition  home   Discharge Medication Reconciliation  Continue  acetaminophen-HYDROcodone (Norco 10 mg-325 mg oral tablet)?1 tab(s), Oral, q4hr, PRN for pain  alPRAzolam (Xanax 0.5 mg oral tablet)?0.5 mg, Oral, At Bedtime  alPRAzolam (alPRAzolam 0.5 mg oral tablet)?0.5 mg, Oral, TID, PRN anxiety  for anxiety  amoxicillin (amoxicillin 500 mg oral capsule)?500 mg, Oral, TID  aspirin (aspirin 81 mg oral tablet)?81 mg, Oral, Daily  atorvastatin (atorvastatin 80 mg oral tablet)?80 mg, Oral, Daily  carbidopa-levodopa (carbidopa-levodopa 25 mg-100 mg oral tablet)?2 tab(s), Oral, TID  carbidopa-levodopa (carbidopa-levodopa 50 mg-200 mg oral tablet, extended release)?1 tab(s), Oral, BID  carboxymethylcellulose?1 drop(s), Eye-Both, q4hr  cyclobenzaprine  (cyclobenzaprine 10 mg oral tablet)?10 mg, Oral, TID, PRN for spasm  dexamethasone-tobramycin ophthalmic (dexamethasone-tobramycin 0.1%-0.3% ophthalmic ointment)?See Instructions  docusate (docusate calcium 240 mg oral capsule)?240 mg, Oral, BID  donepezil (donepezil 10 mg oral tablet)?10 mg, Oral, Once a day (at bedtime)  fenofibrate (fenofibrate 48 mg oral tablet)?48 mg, Oral, Daily  fluticasone nasal (fluticasone 50 mcg/inh nasal spray)?2 spray(s), Nasal, Daily  gabapentin (gabapentin 300 mg oral capsule)?300 mg, Oral, BID  glipiZIDE (glipiZIDE 5 mg oral tablet, extended release (XL tab))?5 mg, Oral, Daily  hydrocortisone topical (Hydrocortisone 1% In Absorbase topical ointment)?1 cori, TOP, BID  lisinopril (lisinopril 2.5 mg oral tablet)?2.5 mg, Oral, Daily  loratadine (loratadine 10 mg oral capsule)?10 mg, Oral, Daily  meclizine (meclizine 12.5 mg oral tablet)?12.5 mg, Oral, TID, PRN for dizziness  meloxicam (Mobic 7.5 mg oral tablet)?7.5 mg, Oral, BID  metoprolol (metoprolol tartrate 50 mg oral tab)?25 mg, Oral, Daily  multivitamin with minerals (multivitamin with minerals (Adult Tab))?1 tab(s), Oral, Daily  ocular lubricant (Artificial Tears ophthalmic solution)?1 drop(s), Eye-Right, 5x/Day  omeprazole (omeprazole 20 mg oral DR capsule)?40 mg, Oral, Daily  omeprazole (omeprazole 20 mg oral DR capsule)?20 mg, Oral, Daily  pregabalin (pregabalin 50 mg oral capsule)?50 mg, Oral, BID  selenium sulfide topical (selenium sulfide 2.3% topical shampoo)?1 cori, TOP, Daily  sodium chloride, hypertonic, ophthalmic (sodium chloride, hypertonic 5% ophthalmic solution)?1 drop(s), Eye-Right, BID  sulfamethoxazole-trimethoprim (sulfamethoxazole-trimethoprim 800 mg-160 mg oral tablet)?0.5 tab(s), Oral, Once a day (at bedtime)  sulfamethoxazole-trimethoprim (sulfamethoxazole-trimethoprim 800 mg-160 mg oral tablet)?1 tab(s), Oral, Daily  tamsulosin (tamsulosin 0.4 mg oral capsule)?0.4 mg, Oral, Daily  traZODone (traZODONE 150 mg  oral tab ( Desyrel ))?300 mg, Oral, Once a day (at bedtime)  warfarin (Coumadin)?4 mg, Oral  Follow up  Raul Guillen, within 1 week  ????  one week for voiding trial

## 2022-05-24 ENCOUNTER — HOSPITAL ENCOUNTER (OUTPATIENT)
Facility: HOSPITAL | Age: 82
Discharge: HOME | End: 2022-05-25
Attending: EMERGENCY MEDICINE | Admitting: INTERNAL MEDICINE
Payer: OTHER GOVERNMENT

## 2022-05-24 DIAGNOSIS — R55 POSTURAL DIZZINESS WITH NEAR SYNCOPE: ICD-10-CM

## 2022-05-24 DIAGNOSIS — N17.9 AKI (ACUTE KIDNEY INJURY): Primary | ICD-10-CM

## 2022-05-24 DIAGNOSIS — R07.9 CHEST PAIN: ICD-10-CM

## 2022-05-24 DIAGNOSIS — R55 NEAR SYNCOPE: ICD-10-CM

## 2022-05-24 DIAGNOSIS — E86.0 DEHYDRATION: ICD-10-CM

## 2022-05-24 DIAGNOSIS — I95.1 ORTHOSTATIC HYPOTENSION: ICD-10-CM

## 2022-05-24 DIAGNOSIS — R42 POSTURAL DIZZINESS WITH NEAR SYNCOPE: ICD-10-CM

## 2022-05-24 DIAGNOSIS — I95.9 HYPOTENSION: ICD-10-CM

## 2022-05-24 LAB
ALBUMIN SERPL-MCNC: 4.1 GM/DL (ref 3.4–4.8)
ALBUMIN/GLOB SERPL: 1.3 RATIO (ref 1.1–2)
ALP SERPL-CCNC: 51 UNIT/L (ref 40–150)
ALT SERPL-CCNC: <5 UNIT/L (ref 0–55)
APPEARANCE UR: CLEAR
APTT PPP: 48.7 SECONDS (ref 23.2–33.7)
AST SERPL-CCNC: 23 UNIT/L (ref 5–34)
BACTERIA #/AREA URNS AUTO: NORMAL /HPF
BASOPHILS # BLD AUTO: 0.04 X10(3)/MCL (ref 0–0.2)
BASOPHILS NFR BLD AUTO: 0.6 %
BILIRUB UR QL STRIP.AUTO: NEGATIVE MG/DL
BILIRUBIN DIRECT+TOT PNL SERPL-MCNC: 0.9 MG/DL
BUN SERPL-MCNC: 29.4 MG/DL (ref 8.4–25.7)
CALCIUM SERPL-MCNC: 10.1 MG/DL (ref 8.8–10)
CHLORIDE SERPL-SCNC: 98 MMOL/L (ref 98–107)
CO2 SERPL-SCNC: 29 MMOL/L (ref 23–31)
COLOR UR AUTO: YELLOW
CREAT SERPL-MCNC: 2.31 MG/DL (ref 0.73–1.18)
EOSINOPHIL # BLD AUTO: 0.08 X10(3)/MCL (ref 0–0.9)
EOSINOPHIL NFR BLD AUTO: 1.2 %
ERYTHROCYTE [DISTWIDTH] IN BLOOD BY AUTOMATED COUNT: 13.7 % (ref 11.5–17)
GLOBULIN SER-MCNC: 3.1 GM/DL (ref 2.4–3.5)
GLUCOSE SERPL-MCNC: 201 MG/DL (ref 82–115)
GLUCOSE UR QL STRIP.AUTO: ABNORMAL MG/DL
HCT VFR BLD AUTO: 42.8 % (ref 42–52)
HGB BLD-MCNC: 14.3 GM/DL (ref 14–18)
IMM GRANULOCYTES # BLD AUTO: 0.03 X10(3)/MCL (ref 0–0.02)
IMM GRANULOCYTES NFR BLD AUTO: 0.5 % (ref 0–0.43)
INR BLD: 2.16 (ref 0–1.3)
KETONES UR QL STRIP.AUTO: NEGATIVE MG/DL
LACTATE SERPL-SCNC: 1.6 MMOL/L (ref 0.5–2.2)
LEUKOCYTE ESTERASE UR QL STRIP.AUTO: NEGATIVE UNIT/L
LYMPHOCYTES # BLD AUTO: 2.08 X10(3)/MCL (ref 0.6–4.6)
LYMPHOCYTES NFR BLD AUTO: 31.8 %
MCH RBC QN AUTO: 30.8 PG (ref 27–31)
MCHC RBC AUTO-ENTMCNC: 33.4 MG/DL (ref 33–36)
MCV RBC AUTO: 92 FL (ref 80–94)
MONOCYTES # BLD AUTO: 0.7 X10(3)/MCL (ref 0.1–1.3)
MONOCYTES NFR BLD AUTO: 10.7 %
NEUTROPHILS # BLD AUTO: 3.6 X10(3)/MCL (ref 2.1–9.2)
NEUTROPHILS NFR BLD AUTO: 55.2 %
NITRITE UR QL STRIP.AUTO: NEGATIVE
NRBC BLD AUTO-RTO: 0 %
PH UR STRIP.AUTO: 5 [PH]
PLATELET # BLD AUTO: 208 X10(3)/MCL (ref 130–400)
PMV BLD AUTO: 10.2 FL (ref 9.4–12.4)
POTASSIUM SERPL-SCNC: 4 MMOL/L (ref 3.5–5.1)
PROT SERPL-MCNC: 7.2 GM/DL (ref 5.8–7.6)
PROT UR QL STRIP.AUTO: NEGATIVE MG/DL
PROTHROMBIN TIME: 23.6 SECONDS (ref 12.5–14.5)
RBC # BLD AUTO: 4.65 X10(6)/MCL (ref 4.7–6.1)
RBC #/AREA URNS AUTO: <5 /HPF
RBC UR QL AUTO: NEGATIVE UNIT/L
SODIUM SERPL-SCNC: 137 MMOL/L (ref 136–145)
SP GR UR STRIP.AUTO: 1 (ref 1–1.03)
SQUAMOUS #/AREA URNS AUTO: <4 /LPF
TROPONIN I SERPL-MCNC: 0.01 NG/ML (ref 0–0.04)
TSH SERPL-ACNC: 2.37 UIU/ML (ref 0.35–4.94)
UROBILINOGEN UR STRIP-ACNC: 0.2 MG/DL
WBC # SPEC AUTO: 6.6 X10(3)/MCL (ref 4.5–11.5)
WBC #/AREA URNS AUTO: <5 /HPF

## 2022-05-24 PROCEDURE — 81001 URINALYSIS AUTO W/SCOPE: CPT | Performed by: PHYSICIAN ASSISTANT

## 2022-05-24 PROCEDURE — G0378 HOSPITAL OBSERVATION PER HR: HCPCS

## 2022-05-24 PROCEDURE — 25000003 PHARM REV CODE 250: Performed by: EMERGENCY MEDICINE

## 2022-05-24 PROCEDURE — 85610 PROTHROMBIN TIME: CPT | Performed by: PHYSICIAN ASSISTANT

## 2022-05-24 PROCEDURE — 84484 ASSAY OF TROPONIN QUANT: CPT | Performed by: PHYSICIAN ASSISTANT

## 2022-05-24 PROCEDURE — 25000003 PHARM REV CODE 250: Performed by: PHYSICIAN ASSISTANT

## 2022-05-24 PROCEDURE — 85730 THROMBOPLASTIN TIME PARTIAL: CPT | Performed by: PHYSICIAN ASSISTANT

## 2022-05-24 PROCEDURE — 36415 COLL VENOUS BLD VENIPUNCTURE: CPT | Performed by: PHYSICIAN ASSISTANT

## 2022-05-24 PROCEDURE — 85025 COMPLETE CBC W/AUTO DIFF WBC: CPT | Performed by: PHYSICIAN ASSISTANT

## 2022-05-24 PROCEDURE — 84443 ASSAY THYROID STIM HORMONE: CPT | Performed by: EMERGENCY MEDICINE

## 2022-05-24 PROCEDURE — 96360 HYDRATION IV INFUSION INIT: CPT

## 2022-05-24 PROCEDURE — 96361 HYDRATE IV INFUSION ADD-ON: CPT

## 2022-05-24 PROCEDURE — 80053 COMPREHEN METABOLIC PANEL: CPT | Performed by: PHYSICIAN ASSISTANT

## 2022-05-24 PROCEDURE — 83605 ASSAY OF LACTIC ACID: CPT | Performed by: PHYSICIAN ASSISTANT

## 2022-05-24 PROCEDURE — 93005 ELECTROCARDIOGRAM TRACING: CPT

## 2022-05-24 PROCEDURE — 99291 CRITICAL CARE FIRST HOUR: CPT | Mod: 25

## 2022-05-24 RX ORDER — SODIUM CHLORIDE 9 MG/ML
1000 INJECTION, SOLUTION INTRAVENOUS
Status: ACTIVE | OUTPATIENT
Start: 2022-05-24 | End: 2022-05-25

## 2022-05-24 RX ADMIN — SODIUM CHLORIDE 1000 ML: 9 INJECTION, SOLUTION INTRAVENOUS at 03:05

## 2022-05-24 RX ADMIN — SODIUM CHLORIDE 500 ML: 9 INJECTION, SOLUTION INTRAVENOUS at 10:05

## 2022-05-24 NOTE — FIRST PROVIDER EVALUATION
Medical screening exam completed.  I have conducted a focused provider triage encounter, findings are as follows:    Brief history of present illness:  Male with hypotension noted at psych appt sent to ER for evalaution.    There were no vitals filed for this visit.    Pertinent physical exam:  Awake alert pleasant male sitting in chair    Brief workup plan:  Labs, IV hydration    Preliminary workup initiated; this workup will be continued and followed by the physician or advanced practice provider that is assigned to the patient when roomed.

## 2022-05-25 VITALS
HEART RATE: 61 BPM | TEMPERATURE: 98 F | DIASTOLIC BLOOD PRESSURE: 86 MMHG | RESPIRATION RATE: 15 BRPM | OXYGEN SATURATION: 99 % | SYSTOLIC BLOOD PRESSURE: 141 MMHG

## 2022-05-25 PROBLEM — I95.9 HYPOTENSION ARTERIAL: Status: RESOLVED | Noted: 2022-05-25 | Resolved: 2022-05-25

## 2022-05-25 PROBLEM — I95.9 HYPOTENSION: Status: RESOLVED | Noted: 2022-05-25 | Resolved: 2022-05-25

## 2022-05-25 PROBLEM — I95.9 HYPOTENSION: Status: ACTIVE | Noted: 2022-05-25

## 2022-05-25 PROBLEM — R53.1 WEAKNESS: Status: ACTIVE | Noted: 2022-05-25

## 2022-05-25 PROBLEM — E86.1 HYPOVOLEMIA: Status: ACTIVE | Noted: 2022-05-25

## 2022-05-25 PROBLEM — I95.9 HYPOTENSION ARTERIAL: Status: ACTIVE | Noted: 2022-05-25

## 2022-05-25 PROBLEM — E11.9 DIABETES MELLITUS TYPE 2 IN NONOBESE: Status: ACTIVE | Noted: 2020-02-06

## 2022-05-25 PROBLEM — R53.1 WEAKNESS: Status: RESOLVED | Noted: 2022-05-25 | Resolved: 2022-05-25

## 2022-05-25 PROBLEM — E78.5 HYPERLIPIDEMIA: Status: ACTIVE | Noted: 2022-05-25

## 2022-05-25 LAB
ANION GAP SERPL CALC-SCNC: 9 MEQ/L
AV INDEX (PROSTH): 0.43
AV MEAN GRADIENT: 3 MMHG
AV PEAK GRADIENT: 5 MMHG
AV REGURGITATION PRESSURE HALF TIME: 799 MS
AV VALVE AREA: 1.62 CM2
AV VELOCITY RATIO: 0.46
BASOPHILS # BLD AUTO: 0.03 X10(3)/MCL (ref 0–0.2)
BASOPHILS NFR BLD AUTO: 0.6 %
BUN SERPL-MCNC: 25.7 MG/DL (ref 8.4–25.7)
CALCIUM SERPL-MCNC: 9 MG/DL (ref 8.8–10)
CHLORIDE SERPL-SCNC: 100 MMOL/L (ref 98–107)
CO2 SERPL-SCNC: 29 MMOL/L (ref 23–31)
CREAT SERPL-MCNC: 2.13 MG/DL (ref 0.73–1.18)
CREAT/UREA NIT SERPL: 12
CV ECHO LV RWT: 0.37 CM
DOP CALC AO PEAK VEL: 1.17 M/S
DOP CALC AO VTI: 25.5 CM
DOP CALC LVOT AREA: 3.8 CM2
DOP CALC LVOT DIAMETER: 2.2 CM
DOP CALC LVOT PEAK VEL: 0.54 M/S
DOP CALC LVOT STROKE VOLUME: 41.41 CM3
DOP CALC MV VTI: 33.1 CM
DOP CALCLVOT PEAK VEL VTI: 10.9 CM
E/A RATIO: 2.84
E/E' RATIO: 15.43 M/S
ECHO LV POSTERIOR WALL: 1.15 CM (ref 0.6–1.1)
EJECTION FRACTION: 25 %
EOSINOPHIL # BLD AUTO: 0.15 X10(3)/MCL (ref 0–0.9)
EOSINOPHIL NFR BLD AUTO: 3 %
ERYTHROCYTE [DISTWIDTH] IN BLOOD BY AUTOMATED COUNT: 13.5 % (ref 11.5–17)
FRACTIONAL SHORTENING: 12 % (ref 28–44)
GLUCOSE SERPL-MCNC: 253 MG/DL (ref 82–115)
HCT VFR BLD AUTO: 38.7 % (ref 42–52)
HGB BLD-MCNC: 12.7 GM/DL (ref 14–18)
IMM GRANULOCYTES # BLD AUTO: 0 X10(3)/MCL (ref 0–0.02)
IMM GRANULOCYTES NFR BLD AUTO: 0 % (ref 0–0.43)
INR BLD: 2.56 (ref 0–1.3)
INTERVENTRICULAR SEPTUM: 0.8 CM (ref 0.6–1.1)
LEFT ATRIUM SIZE: 5.2 CM
LEFT ATRIUM VOLUME MOD: 65.2 CM3
LEFT INTERNAL DIMENSION IN SYSTOLE: 5.53 CM (ref 2.1–4)
LEFT VENTRICLE DIASTOLIC VOLUME: 62 ML
LEFT VENTRICLE SYSTOLIC VOLUME: 149 ML
LEFT VENTRICULAR INTERNAL DIMENSION IN DIASTOLE: 6.3 CM (ref 3.5–6)
LEFT VENTRICULAR MASS: 259.74 G
LV LATERAL E/E' RATIO: 13.5 M/S
LV SEPTAL E/E' RATIO: 18 M/S
LVOT MG: 1 MMHG
LVOT MV: 0.32 CM/S
LYMPHOCYTES # BLD AUTO: 2.4 X10(3)/MCL (ref 0.6–4.6)
LYMPHOCYTES NFR BLD AUTO: 47.8 %
MAGNESIUM SERPL-MCNC: 1.8 MG/DL (ref 1.6–2.6)
MCH RBC QN AUTO: 30.4 PG (ref 27–31)
MCHC RBC AUTO-ENTMCNC: 32.8 MG/DL (ref 33–36)
MCV RBC AUTO: 92.6 FL (ref 80–94)
MONOCYTES # BLD AUTO: 0.52 X10(3)/MCL (ref 0.1–1.3)
MONOCYTES NFR BLD AUTO: 10.4 %
MV MEAN GRADIENT: 3 MMHG
MV PEAK A VEL: 0.38 M/S
MV PEAK E VEL: 1.08 M/S
MV PEAK GRADIENT: 6 MMHG
MV STENOSIS PRESSURE HALF TIME: 82 MS
MV VALVE AREA BY CONTINUITY EQUATION: 1.25 CM2
MV VALVE AREA P 1/2 METHOD: 2.68 CM2
NEUTROPHILS # BLD AUTO: 1.9 X10(3)/MCL (ref 2.1–9.2)
NEUTROPHILS NFR BLD AUTO: 38.2 %
NRBC BLD AUTO-RTO: 0 %
PHOSPHATE SERPL-MCNC: 3.6 MG/DL (ref 2.3–4.7)
PISA AR MAX VEL: 4.24 M/S
PISA TR MAX VEL: 2.12 M/S
PLATELET # BLD AUTO: 165 X10(3)/MCL (ref 130–400)
PMV BLD AUTO: 10 FL (ref 9.4–12.4)
POTASSIUM SERPL-SCNC: 4.3 MMOL/L (ref 3.5–5.1)
PROTHROMBIN TIME: 27.1 SECONDS (ref 12.5–14.5)
PV PEAK VELOCITY: 0.92 CM/S
RA PRESSURE: 8 MMHG
RBC # BLD AUTO: 4.18 X10(6)/MCL (ref 4.7–6.1)
RIGHT VENTRICULAR END-DIASTOLIC DIMENSION: 2.35 CM
SODIUM SERPL-SCNC: 138 MMOL/L (ref 136–145)
TDI LATERAL: 0.08 M/S
TDI SEPTAL: 0.06 M/S
TDI: 0.07 M/S
TR MAX PG: 18 MMHG
TRICUSPID ANNULAR PLANE SYSTOLIC EXCURSION: 1.32 CM
TV REST PULMONARY ARTERY PRESSURE: 26 MMHG
WBC # SPEC AUTO: 5 X10(3)/MCL (ref 4.5–11.5)

## 2022-05-25 PROCEDURE — 25000003 PHARM REV CODE 250: Performed by: INTERNAL MEDICINE

## 2022-05-25 PROCEDURE — 84100 ASSAY OF PHOSPHORUS: CPT | Performed by: INTERNAL MEDICINE

## 2022-05-25 PROCEDURE — G0378 HOSPITAL OBSERVATION PER HR: HCPCS

## 2022-05-25 PROCEDURE — 80048 BASIC METABOLIC PNL TOTAL CA: CPT | Performed by: INTERNAL MEDICINE

## 2022-05-25 PROCEDURE — 83735 ASSAY OF MAGNESIUM: CPT | Performed by: INTERNAL MEDICINE

## 2022-05-25 PROCEDURE — 85025 COMPLETE CBC W/AUTO DIFF WBC: CPT | Performed by: INTERNAL MEDICINE

## 2022-05-25 PROCEDURE — 36415 COLL VENOUS BLD VENIPUNCTURE: CPT | Performed by: INTERNAL MEDICINE

## 2022-05-25 PROCEDURE — 85610 PROTHROMBIN TIME: CPT | Performed by: INTERNAL MEDICINE

## 2022-05-25 RX ORDER — ALPRAZOLAM 1 MG/1
0.5 TABLET ORAL 2 TIMES DAILY
COMMUNITY

## 2022-05-25 RX ORDER — TRAZODONE HYDROCHLORIDE 150 MG/1
300 TABLET ORAL NIGHTLY
COMMUNITY

## 2022-05-25 RX ORDER — ACETAMINOPHEN 500 MG
1000 TABLET ORAL EVERY 6 HOURS PRN
Status: DISCONTINUED | OUTPATIENT
Start: 2022-05-25 | End: 2022-05-25 | Stop reason: HOSPADM

## 2022-05-25 RX ORDER — MAG HYDROX/ALUMINUM HYD/SIMETH 200-200-20
30 SUSPENSION, ORAL (FINAL DOSE FORM) ORAL 4 TIMES DAILY PRN
Status: DISCONTINUED | OUTPATIENT
Start: 2022-05-25 | End: 2022-05-25 | Stop reason: HOSPADM

## 2022-05-25 RX ORDER — SODIUM CHLORIDE 0.9 % (FLUSH) 0.9 %
10 SYRINGE (ML) INJECTION
Status: DISCONTINUED | OUTPATIENT
Start: 2022-05-25 | End: 2022-05-25 | Stop reason: HOSPADM

## 2022-05-25 RX ORDER — CARBIDOPA AND LEVODOPA 25; 100 MG/1; MG/1
2 TABLET ORAL 2 TIMES DAILY
COMMUNITY

## 2022-05-25 RX ORDER — NAPROXEN SODIUM 220 MG/1
81 TABLET, FILM COATED ORAL DAILY
Status: DISCONTINUED | OUTPATIENT
Start: 2022-05-25 | End: 2022-05-25 | Stop reason: HOSPADM

## 2022-05-25 RX ORDER — SIMETHICONE 80 MG
1 TABLET,CHEWABLE ORAL 4 TIMES DAILY PRN
Status: DISCONTINUED | OUTPATIENT
Start: 2022-05-25 | End: 2022-05-25 | Stop reason: HOSPADM

## 2022-05-25 RX ORDER — METOPROLOL SUCCINATE 25 MG/1
25 TABLET, EXTENDED RELEASE ORAL
COMMUNITY

## 2022-05-25 RX ORDER — LORATADINE 10 MG/1
10 TABLET ORAL DAILY
COMMUNITY
End: 2022-09-08

## 2022-05-25 RX ORDER — SODIUM CHLORIDE 9 MG/ML
INJECTION, SOLUTION INTRAVENOUS CONTINUOUS
Status: DISCONTINUED | OUTPATIENT
Start: 2022-05-25 | End: 2022-05-25 | Stop reason: HOSPADM

## 2022-05-25 RX ORDER — TALC
6 POWDER (GRAM) TOPICAL NIGHTLY PRN
Status: DISCONTINUED | OUTPATIENT
Start: 2022-05-25 | End: 2022-05-25 | Stop reason: HOSPADM

## 2022-05-25 RX ORDER — KETOCONAZOLE 20 MG/G
1 CREAM TOPICAL DAILY
COMMUNITY
End: 2022-09-08

## 2022-05-25 RX ORDER — DOCUSATE CALCIUM 240 MG
100 CAPSULE ORAL 2 TIMES DAILY
Status: ON HOLD | COMMUNITY
End: 2022-09-08

## 2022-05-25 RX ORDER — HYDROCODONE BITARTRATE AND ACETAMINOPHEN 10; 325 MG/1; MG/1
1 TABLET ORAL EVERY 4 HOURS PRN
COMMUNITY

## 2022-05-25 RX ORDER — ONDANSETRON 2 MG/ML
4 INJECTION INTRAMUSCULAR; INTRAVENOUS EVERY 4 HOURS PRN
Status: DISCONTINUED | OUTPATIENT
Start: 2022-05-25 | End: 2022-05-25 | Stop reason: HOSPADM

## 2022-05-25 RX ORDER — DONEPEZIL HYDROCHLORIDE 10 MG/1
10 TABLET, FILM COATED ORAL NIGHTLY
COMMUNITY

## 2022-05-25 RX ORDER — SODIUM CHLORIDE 50 MG/ML
1 SOLUTION/ DROPS OPHTHALMIC 2 TIMES DAILY
COMMUNITY

## 2022-05-25 RX ORDER — ATORVASTATIN CALCIUM 80 MG/1
80 TABLET, FILM COATED ORAL NIGHTLY
COMMUNITY

## 2022-05-25 RX ORDER — CYCLOBENZAPRINE HCL 10 MG
10 TABLET ORAL 3 TIMES DAILY
COMMUNITY

## 2022-05-25 RX ORDER — CARBIDOPA AND LEVODOPA 25; 100 MG/1; MG/1
1 TABLET ORAL 2 TIMES DAILY
Status: DISCONTINUED | OUTPATIENT
Start: 2022-05-25 | End: 2022-05-25 | Stop reason: HOSPADM

## 2022-05-25 RX ORDER — MECLIZINE HCL 12.5 MG 12.5 MG/1
12.5 TABLET ORAL 2 TIMES DAILY
COMMUNITY

## 2022-05-25 RX ORDER — POLYETHYLENE GLYCOL 3350 17 G/17G
17 POWDER, FOR SOLUTION ORAL 2 TIMES DAILY PRN
Status: DISCONTINUED | OUTPATIENT
Start: 2022-05-25 | End: 2022-05-25 | Stop reason: HOSPADM

## 2022-05-25 RX ORDER — WARFARIN 4 MG/1
6 TABLET ORAL
COMMUNITY

## 2022-05-25 RX ORDER — DONEPEZIL HYDROCHLORIDE 5 MG/1
10 TABLET, FILM COATED ORAL NIGHTLY
Status: DISCONTINUED | OUTPATIENT
Start: 2022-05-25 | End: 2022-05-25 | Stop reason: HOSPADM

## 2022-05-25 RX ORDER — AMOXICILLIN 250 MG
1 CAPSULE ORAL 2 TIMES DAILY PRN
Status: DISCONTINUED | OUTPATIENT
Start: 2022-05-25 | End: 2022-05-25 | Stop reason: HOSPADM

## 2022-05-25 RX ORDER — GUAIFENESIN 100 MG/5ML
200 SOLUTION ORAL 4 TIMES DAILY PRN
Status: ON HOLD | COMMUNITY
End: 2022-09-08

## 2022-05-25 RX ORDER — METOPROLOL SUCCINATE 50 MG/1
50 TABLET, EXTENDED RELEASE ORAL DAILY
Status: DISCONTINUED | OUTPATIENT
Start: 2022-05-25 | End: 2022-05-25 | Stop reason: HOSPADM

## 2022-05-25 RX ORDER — HYDROCORTISONE 1 %
1 CREAM (GRAM) TOPICAL 2 TIMES DAILY
COMMUNITY
End: 2022-09-08

## 2022-05-25 RX ORDER — ACETAMINOPHEN 325 MG/1
650 TABLET ORAL EVERY 4 HOURS PRN
Status: DISCONTINUED | OUTPATIENT
Start: 2022-05-25 | End: 2022-05-25 | Stop reason: HOSPADM

## 2022-05-25 RX ORDER — ALPRAZOLAM 0.5 MG/1
0.5 TABLET ORAL 2 TIMES DAILY PRN
Status: DISCONTINUED | OUTPATIENT
Start: 2022-05-25 | End: 2022-05-25 | Stop reason: HOSPADM

## 2022-05-25 RX ORDER — NAPROXEN SODIUM 220 MG/1
81 TABLET, FILM COATED ORAL
COMMUNITY

## 2022-05-25 RX ORDER — HYDROCODONE BITARTRATE AND ACETAMINOPHEN 10; 325 MG/1; MG/1
1 TABLET ORAL EVERY 6 HOURS PRN
Status: DISCONTINUED | OUTPATIENT
Start: 2022-05-25 | End: 2022-05-25 | Stop reason: HOSPADM

## 2022-05-25 RX ORDER — FLUTICASONE PROPIONATE 50 MCG
2 SPRAY, SUSPENSION (ML) NASAL DAILY
COMMUNITY

## 2022-05-25 RX ORDER — ATORVASTATIN CALCIUM 40 MG/1
80 TABLET, FILM COATED ORAL NIGHTLY
Status: DISCONTINUED | OUTPATIENT
Start: 2022-05-25 | End: 2022-05-25 | Stop reason: HOSPADM

## 2022-05-25 RX ORDER — PROCHLORPERAZINE EDISYLATE 5 MG/ML
5 INJECTION INTRAMUSCULAR; INTRAVENOUS EVERY 6 HOURS PRN
Status: DISCONTINUED | OUTPATIENT
Start: 2022-05-25 | End: 2022-05-25 | Stop reason: HOSPADM

## 2022-05-25 RX ORDER — TRAZODONE HYDROCHLORIDE 100 MG/1
300 TABLET ORAL NIGHTLY
Status: DISCONTINUED | OUTPATIENT
Start: 2022-05-25 | End: 2022-05-25 | Stop reason: HOSPADM

## 2022-05-25 RX ORDER — CYCLOBENZAPRINE HCL 10 MG
10 TABLET ORAL 3 TIMES DAILY PRN
Status: DISCONTINUED | OUTPATIENT
Start: 2022-05-25 | End: 2022-05-25 | Stop reason: HOSPADM

## 2022-05-25 RX ADMIN — SODIUM CHLORIDE: 9 INJECTION, SOLUTION INTRAVENOUS at 12:05

## 2022-05-25 RX ADMIN — Medication 81 MG: at 09:05

## 2022-05-25 RX ADMIN — CARBIDOPA AND LEVODOPA 1 TABLET: 25; 100 TABLET ORAL at 09:05

## 2022-05-25 NOTE — DISCHARGE SUMMARY
[facility] Hospital Medicine Discharge Summary    Admit Date: 5/24/2022  Discharge Date and Time: 5/25/20224:17 PM  Admitting Physician: [unfilled]  Discharging Physician: Erik Rawls MD.  Primary Care Physician: Primary Doctor No  Consults:     Discharge Diagnoses:    1. Hypotension due to hypovolemia - resolved  2. Acute kidney injury superimposed on CKD3  HX PAF, mitral valve repair, CAD/MI, AICD, HTN, HLD, BPH, GERD     Hospital Course:   This is an 81-year-old male with medical history of CAD/MI, valve repair, AFib on warfarin, AICD, presented 79 with complaint of dizziness and hypotension as his psychiatric appointment today. Report he did not eat or drink anything since morning and at the appointment he started feeling dizzy/lightheaded, denies chest pain, palpitation or shortness of breath. His blood pressure found to be low and was sent to the ED for further evaluation. Denies melena or hematochezia.   On arrival his BP 92/54. EKG ventricular paced. Labs notable for Hb 14.3, creatinine 2.31, Troponin 0.011, he was given IV fluid, blood pressure normalized and dizziness resolved, and referred to hospital medicine service for further evaluation and management.  82 years old male a history of a chronic renal failure was hospitalized for acute on chronic renal failure secondary to the dehydration    Patient was treated with normal saline 125 cc/hour and the blood pressure improved 120/70 from 90/50  creatinine improved to 2.1 from 2.3     After 2 days of hospitalization, patient felt much better generalized weakness is improved the     no fever, no cough, no shortness for breath,    The patient tolerated oral intake     Patient can go home today and have a follow-up with primary care doctor     Pt was seen and examined on the day of discharge  Vitals:  VITAL SIGNS: 24 HRS MIN & MAX LAST   No data recorded 97.5 °F (36.4 °C)   BP  Min: 94/69  Max: 141/86 (!) 141/86   Pulse   Min: 59  Max: 88  61   Resp  Min: 12  Max: 20 15   SpO2  Min: 94 %  Max: 100 % 99 %       Physical Exam:  General: Appears comfortable   HEENT: NC/AT   Neck: No JVD   Chest: CTABL   CVS: Regular rhythm. Normal S1/S2. No pedal edema   Abdomen: nondistended, normoactive BS, soft and non-tender.   MSK: No obvious deformity or joint swelling   Skin: Warm and dry   Neuro: AAOx3, no focal neurological deficit   Psych: Cooperative      Procedures Performed: No admission procedures for hospital encounter.     Significant Diagnostic Studies: See Full reports for all details    Recent Labs   Lab 05/24/22  1503 05/25/22  0351   WBC 6.6 5.0   RBC 4.65* 4.18*   HGB 14.3 12.7*   HCT 42.8 38.7*   MCV 92.0 92.6   MCH 30.8 30.4   MCHC 33.4 32.8*   RDW 13.7 13.5    165   MPV 10.2 10.0       Recent Labs   Lab 05/24/22  1503 05/25/22  0351    138   K 4.0 4.3   CO2 29 29   BUN 29.4* 25.7   CREATININE 2.31* 2.13*   CALCIUM 10.1* 9.0   MG  --  1.80   ALBUMIN 4.1  --    ALKPHOS 51  --    ALT <5  --    AST 23  --    BILITOT 0.9  --         Microbiology Results (last 7 days)     ** No results found for the last 168 hours. **           Echo  · The left ventricle is moderately enlarged with moderate eccentric   hypertrophy and severely decreased systolic function.  · The estimated ejection fraction is 25%.  · There is severe left ventricular global hypokinesis.  · Grade III left ventricular diastolic dysfunction.  · Mild right ventricular enlargement with severely reduced right   ventricular systolic function.  · Mild aortic regurgitation.  · The mitral valve is s/p repair.  · Mild mitral regurgitation.  · Intermediate central venous pressure (8 mmHg).            Medication List      ASK your doctor about these medications    ALPRAZolam 1 MG tablet  Commonly known as: XANAX     aspirin 81 MG Chew     atorvastatin 80 MG tablet  Commonly known as: LIPITOR     carbidopa-levodopa  mg  mg per tablet  Commonly known as:  SINEMET     cyclobenzaprine 10 MG tablet  Commonly known as: FLEXERIL     docusate calcium 240 mg capsule  Commonly known as: SURFAK     donepeziL 10 MG tablet  Commonly known as: ARICEPT     fluticasone propionate 50 mcg/actuation nasal spray  Commonly known as: FLONASE     guaiFENesin 100 mg/5 ml 100 mg/5 mL syrup  Commonly known as: ROBITUSSIN     HYDROcodone-acetaminophen  mg per tablet  Commonly known as: NORCO     hydrocortisone 1 % cream     ketoconazole 2 % cream  Commonly known as: NIZORAL     loratadine 10 mg tablet  Commonly known as: CLARITIN     meclizine 25 MG tablet  Commonly known as: ANTIVERT     metoprolol succinate 50 MG 24 hr tablet  Commonly known as: TOPROL-XL     multivitamin capsule     selenium sulfide 1 % Sham  Commonly known as: SELSUN     sodium chloride 5% 5 % ophthalmic solution  Commonly known as: CASSIUS 128     traZODone 100 MG tablet  Commonly known as: DESYREL     warfarin 4 MG tablet  Commonly known as: COUMADIN             Explained in detail to the patient about the discharge plan, medications, and follow-up visits. Pt understands and agrees with the treatment plan  Discharge Disposition: Home or Self Care   Discharged Condition: stable  Diet-    Medications Per DC med rec  Activities as tolerated    For further questions contact hospitalist office    Discharge time 33 minutes    For worsening symptoms, chest pain, shortness of breath, increased abdominal pain, high grade fever, stroke or stroke like symptoms, immediately go to the nearest Emergency Room or call 911 as soon as possible.      Erik York M.D on 5/25/2022. at 4:17 PM.

## 2022-05-25 NOTE — PROGRESS NOTES
Met with patient to conduct initial cm dc planning assessment. Patient lives at [address] in [place] with his wife, Hannah David [phone]. PH [phone]. There is running water and electricity in the home. There is a ramp to enter and no stairs inside. He has 4 children. He completed 9th grade and is currently retired. He completes all ADLs independently, although his wife primarily drives patient. His wife will be transporting him home upon dc. [facility] but he could not remember their name. He is retired [employer]. No agency involvement other than [facility]. He has a glucometer and BP cuff at home, as well as his mothers wheelchair that he does not use. He denies recent falls or surgeries. He is hard of hearing but does not have hearing aids. He does not wear glasses. He is able to afford meds and fills either through [facility] or at [facility] in [place]. He is able to afford food. He does not have a living will, POA or DNR. He denied known needs prior to DC

## 2022-05-25 NOTE — ED PROVIDER NOTES
Encounter Date: 5/24/2022    SCRIBE #1 NOTE: I, Chitra Berg, am scribing for, and in the presence of,  Virginia Mercado MD. I have scribed the following portions of the note - Other sections scribed: HPI, ROS, PE.       History     Chief Complaint   Patient presents with    Dizziness     Pt to ER via POV for dizziness.  Pt family states he is eating, but recently not drinking enough water for the last week.  Today was at psych appt and noted to have low BP (66/42)     81 year old male with a hx of heart failure, DM, HTN, and Parkinson's disease presents to the ED after an episode of dizziness upon standing at his doctor's appointment today with hypotension in the 60s/30s. The patient reports that he feels normal now and can stand without dizziness, however his family member at the bedside reports that he has been acting strange for the past week, similarly to when he was dehydrated in the past. [facility] [place] [facility] and [facility].    The history is provided by the patient and a relative. No  was used.   Dizziness  This is a new problem. The current episode started 6 to 12 hours ago. The problem has been resolved. Pertinent negatives include no chest pain, no abdominal pain, no headaches and no shortness of breath. The symptoms are aggravated by standing. The symptoms are relieved by rest. He has tried nothing for the symptoms.     Review of patient's allergies indicates:   Allergen Reactions    Baclofen     Morphine Other (See Comments)     Neuro symptoms      Zolpidem      Past Medical History:   Diagnosis Date    Diabetes mellitus     Hypercholesterolemia     Hypertension     Parkinson's disease      Past Surgical History:   Procedure Laterality Date    CARDIAC DEFIBRILLATOR PLACEMENT      CATARACT EXTRACTION      CHOLECYSTECTOMY      REPAIR OF HEART VALVE       No family history on file.     Review of Systems   Constitutional: Negative for chills,  diaphoresis and fever.   HENT: Negative for congestion, ear pain, sinus pain and sore throat.    Eyes: Negative for pain, discharge and visual disturbance.   Respiratory: Negative for cough, shortness of breath, wheezing and stridor.    Cardiovascular: Negative for chest pain and palpitations.        Hypotension   Gastrointestinal: Negative for abdominal pain, constipation, diarrhea, nausea, rectal pain and vomiting.   Genitourinary: Negative for dysuria and hematuria.   Musculoskeletal: Negative for back pain and myalgias.   Skin: Negative for rash.   Neurological: Positive for dizziness (resolved). Negative for syncope, numbness and headaches.   Hematological: Negative.    Psychiatric/Behavioral: Negative.    All other systems reviewed and are negative.      Physical Exam     Initial Vitals [05/24/22 1447]   BP Pulse Resp Temp SpO2   (!) 92/54 70 18 97.5 °F (36.4 °C) 97 %      MAP       --         Physical Exam    Nursing note and vitals reviewed.  Constitutional: He appears well-developed and well-nourished. He is not diaphoretic. No distress.   HENT:   Head: Normocephalic and atraumatic.   Nose: Nose normal.   Mouth/Throat: Oropharynx is clear and moist.   slightly dry oral mucosa   Eyes: Conjunctivae and EOM are normal. Pupils are equal, round, and reactive to light.   Neck: Trachea normal. Neck supple.   Normal range of motion.  Cardiovascular: Normal rate, regular rhythm, normal heart sounds and intact distal pulses. Exam reveals no gallop and no friction rub.    No murmur heard.  No edema   Pulmonary/Chest: Breath sounds normal. No respiratory distress. He has no wheezes. He has no rhonchi. He has no rales. He exhibits no tenderness.   Abdominal: Abdomen is soft. Bowel sounds are normal. He exhibits no distension and no mass. There is no abdominal tenderness. There is no rebound.   Musculoskeletal:         General: No tenderness or edema. Normal range of motion.      Cervical back: Normal range of motion and  neck supple.      Lumbar back: Normal. No tenderness. Normal range of motion.     Neurological: He is alert and oriented to person, place, and time. He has normal strength. No cranial nerve deficit or sensory deficit.   slightly confused   Skin: Skin is warm and dry. Capillary refill takes less than 2 seconds. No rash and no abscess noted. No erythema. No pallor.   Psychiatric: He has a normal mood and affect. His behavior is normal. Judgment and thought content normal.         ED Course   Critical Care    Date/Time: 5/24/2022 11:06 PM  Performed by: Virginia Mercado MD  Authorized by: Virginia Mercado MD   Direct patient critical care time: 15 minutes  Additional history critical care time: 5 minutes  Ordering / reviewing critical care time: 6 minutes  Documentation critical care time: 7 minutes  Consulting other physicians critical care time: 5 minutes  Total critical care time (exclusive of procedural time) : 38 minutes  Critical care was necessary to treat or prevent imminent or life-threatening deterioration of the following conditions: circulatory failure, renal failure and dehydration.  Critical care was time spent personally by me on the following activities: discussions with consultants, evaluation of patient's response to treatment, obtaining history from patient or surrogate, ordering and review of laboratory studies, pulse oximetry, review of old charts, development of treatment plan with patient or surrogate, examination of patient, ordering and performing treatments and interventions and re-evaluation of patient's condition.        Labs Reviewed   COMPREHENSIVE METABOLIC PANEL - Abnormal; Notable for the following components:       Result Value    Glucose Level 201 (*)     Blood Urea Nitrogen 29.4 (*)     Creatinine 2.31 (*)     Calcium Level Total 10.1 (*)     All other components within normal limits   APTT - Abnormal; Notable for the following components:    PTT 48.7 (*)     All other components  within normal limits   PROTIME-INR - Abnormal; Notable for the following components:    PT 23.6 (*)     INR 2.16 (*)     All other components within normal limits   CBC WITH DIFFERENTIAL - Abnormal; Notable for the following components:    RBC 4.65 (*)     IG# 0.03 (*)     IG% 0.5 (*)     All other components within normal limits   LACTIC ACID, PLASMA - Normal   TROPONIN I - Normal   TSH - Normal   CBC W/ AUTO DIFFERENTIAL    Narrative:     The following orders were created for panel order CBC auto differential.  Procedure                               Abnormality         Status                     ---------                               -----------         ------                     CBC with Differential[[id number]]        Abnormal            Final result                 Please view results for these tests on the individual orders.   URINALYSIS, REFLEX TO URINE CULTURE   URINALYSIS, MICROSCOPIC     EKG Readings: (Independently Interpreted)   Initial Reading: No STEMI. Rhythm: ventricular paced rhythm with occasional PVCs. Heart Rate: 68.   2227     ECG Results          EKG 12-lead (In process)  Result time 05/24/22 23:17:14    In process by Interface, Lab In [facility] (05/24/22 23:17:14)                 Narrative:    Test Reason : I95.9,    Vent. Rate : 068 BPM     Atrial Rate : 340 BPM     P-R Int : 000 ms          QRS Dur : 198 ms      QT Int : 498 ms       P-R-T Axes : 000 -87 088 degrees     QTc Int : 529 ms    Ventricular-paced rhythm with occasional Premature ventricular complexes  Biventricular pacemaker detected  Abnormal ECG  No previous ECGs available    Referred By: [email or web address]   SELF           Confirmed By:                             Imaging Results    None          Medications   sodium chloride 0.9% bolus 500 mL (500 mLs Intravenous New Bag 5/24/22 3973)   0.9%  NaCl infusion (has no administration in time range)   sodium chloride 0.9% bolus 1,000 mL (0 mLs Intravenous Stopped 5/24/22 1610)     Medical  Decision Making:   History:   I obtained history from: someone other than patient.  Old Medical Records: I decided to obtain old medical records.  Old Records Summarized: records from previous admission(s) and records from clinic visits.       <> Summary of Records: ckd 3, chf  Differential Diagnosis:   Mariama, dysrhythmia, orthostatic hypotension, dehydration  Independently Interpreted Test(s):   I have ordered and independently interpreted EKG Reading(s) - see prior notes  Clinical Tests:   Lab Tests: Ordered and Reviewed  The following lab test(s) were unremarkable: CBC, CMP, Troponin and Urinalysis  Medical Tests: Ordered and Reviewed  ED Management:  Ivf, orthostatic vs, re-eval, admit  Other:   I have discussed this case with another health care provider.          Scribe Attestation:   Scribe #1: I performed the above scribed service and the documentation accurately describes the services I performed. I attest to the accuracy of the note.    Attending Attestation:           Physician Attestation for Scribe:  Physician Attestation Statement for Scribe #1: I, reviewed documentation, as scribed by Chitra Berg in my presence, and it is both accurate and complete.             ED Course as of 05/24/22 2326   Tue May 24, 2022   2255 Given near syncope with significant hypotension, mariama, h/o chf, will obs for gentle rehydration, still orthostatic after ivf [BS]   2255 Consult, hospitalist [MM]      ED Course User Index  [BS] Virginia Mercado MD  [MM] Chitra Berg             Clinical Impression:   Final diagnoses:  [I95.9] Hypotension  [N17.9] MARIAMA (acute kidney injury) (Primary)  [I95.1] Orthostatic hypotension  [E86.0] Dehydration  [R55] Near syncope          ED Disposition Condition    Observation               Virginia Mercado MD  05/24/22 2326

## 2022-05-25 NOTE — H&P
[facility] Hospital Medicine   History & Physical Note      Patient Name: Homar David  : 1940  MRN: [id number]  Patient Class: OP- Observation   Admission Date: 2022   Length of Stay: 0  Admitting Physician:  Service  Attending Physician: Concepcion Vicente MD  PCP: Primary Doctor No  Source of history: Patient, patient's family, and EMR.   Face-to-Face encounter date: 2022  Code status: Full      Chief Complaint   Dizziness (Pt to ER via POV for dizziness.  Pt family states he is eating, but recently not drinking enough water for the last week.  Today was at psych appt and noted to have low BP (66/42))    History of Present Illness   This is an 81-year-old male with medical history of CAD/MI, valve repair, AFib on warfarin, AICD, presented 79 with complaint of dizziness and hypotension as his psychiatric appointment today.  Report he did not eat or drink anything since morning and at the appointment he started feeling dizzy/lightheaded, denies chest pain, palpitation or shortness of breath.  His blood pressure found to be low and was sent to the ED for further evaluation. Denies melena or hematochezia.    On arrival his BP 92/54.  EKG ventricular paced.  Labs notable for Hb 14.3, creatinine 2.31,  Troponin 0.011, he was given IV fluid, blood pressure normalized and dizziness resolved, and referred to hospital medicine service for further evaluation and management.    ROS   Except as documented, all other systems reviewed and negative     Past Medical History     Atrial fibrillation on AC with warafarin  Mitral valve repair/replacement in [place]   CAD/MI ??CABG  AICD  T2DM  Hypertension  Hyperlipidemia   Parkinson's disease   CKD 3A  GERD    Past Surgical History     AICD  Valve repair  TURP  Appendectomy  Neck surgery  Cataract  Right rotator cuff repair    Social History   Former smoker    Family History   Reviewed and negative    Allergies   Baclofen,  Morphine, and Zolpidem    Home Medications     Prior to Admission medications    Not on File        Inpatient Medications   Scheduled Meds   sodium chloride 0.9%  1,000 mL Intravenous ED 1 Time     Continuous Infusions    PRN Meds      Physical Exam   Vital Signs  Temp:  [97.5 °F (36.4 °C)]   Pulse:  [62-88]   Resp:  [12-20]   BP: ()/(54-81)   SpO2:  [96 %-100 %]       General: Appears comfortable  HEENT: NC/AT  Neck:  No JVD  Chest: CTABL  CVS: Regular rhythm. Normal S1/S2. No pedal edema  Abdomen: nondistended, normoactive BS, soft and non-tender.  MSK: No obvious deformity or joint swelling  Skin: Warm and dry  Neuro: AAOx3, no focal neurological deficit  Psych: Cooperative    Labs     Recent Labs     05/24/22  1503   WBC 6.6   RBC 4.65*   HGB 14.3   HCT 42.8   MCV 92.0   MCH 30.8   MCHC 33.4   RDW 13.7        Recent Labs     05/24/22  1502   LACTIC 1.6     Recent Labs     05/24/22  1503   INR 2.16*      Recent Labs     05/24/22  1503      K 4.0   CHLORIDE 98   CO2 29   BUN 29.4*   CREATININE 2.31*   GLUCOSE 201*   CALCIUM 10.1*   ALBUMIN 4.1   GLOBULIN 3.1   ALKPHOS 51   ALT <5   AST 23   BILITOT 0.9   TSH 2.3671     Recent Labs     05/24/22  1503   TROPONINI 0.011          Microbiology Results (last 7 days)     ** No results found for the last 168 hours. **         Imaging     No orders to display     Assessment & Plan   1.  Hypotension due to hypovolemia - resolved  2. Acute kidney injury superimposed on CKD3    HX PAF, mitral valve repair, CAD/MI, AICD,  HTN, HLD, BPH, GERD    Plan:    NS at 100 ml/hr  TTE and carotid dopplers  Repeat CBC, BMP in AM  VTE Prophylaxis: on warfarin with therapeutic INR  Home meds resumed    Possible discharge home tomorrow if Cr improving and tests unremnarkable    Concepcion Vicente MD  [facility] Medicine

## 2022-09-08 ENCOUNTER — HOSPITAL ENCOUNTER (OUTPATIENT)
Facility: HOSPITAL | Age: 82
Discharge: HOME | End: 2022-09-08
Attending: SPECIALIST | Admitting: SPECIALIST
Payer: OTHER GOVERNMENT

## 2022-09-08 VITALS
RESPIRATION RATE: 19 BRPM | HEIGHT: 72 IN | DIASTOLIC BLOOD PRESSURE: 68 MMHG | TEMPERATURE: 98 F | HEART RATE: 77 BPM | OXYGEN SATURATION: 97 % | BODY MASS INDEX: 27.02 KG/M2 | SYSTOLIC BLOOD PRESSURE: 116 MMHG | WEIGHT: 199.5 LBS

## 2022-09-08 DIAGNOSIS — I70.243 ATHEROSCLEROSIS OF NATIVE ARTERY OF LEFT LOWER EXTREMITY WITH ULCERATION OF ANKLE: ICD-10-CM

## 2022-09-08 LAB
ANION GAP SERPL CALC-SCNC: 7 MEQ/L
APTT PPP: 28.5 SECONDS (ref 23.2–33.7)
BUN SERPL-MCNC: 25.1 MG/DL (ref 8.4–25.7)
CALCIUM SERPL-MCNC: 9.7 MG/DL (ref 8.8–10)
CHLORIDE SERPL-SCNC: 104 MMOL/L (ref 98–107)
CO2 SERPL-SCNC: 28 MMOL/L (ref 23–31)
CREAT SERPL-MCNC: 1.67 MG/DL (ref 0.73–1.18)
CREAT/UREA NIT SERPL: 15
GFR SERPLBLD CREATININE-BSD FMLA CKD-EPI: 41 MLS/MIN/1.73/M2
GLUCOSE SERPL-MCNC: 151 MG/DL (ref 82–115)
INR BLD: 1.01 (ref 0–1.3)
POCT GLUCOSE: 154 MG/DL (ref 70–110)
POTASSIUM SERPL-SCNC: 4.1 MMOL/L (ref 3.5–5.1)
PROTHROMBIN TIME: 13.2 SECONDS (ref 12.5–14.5)
SODIUM SERPL-SCNC: 139 MMOL/L (ref 136–145)

## 2022-09-08 PROCEDURE — C1725 CATH, TRANSLUMIN NON-LASER: HCPCS | Performed by: SPECIALIST

## 2022-09-08 PROCEDURE — 99152 MOD SED SAME PHYS/QHP 5/>YRS: CPT | Performed by: SPECIALIST

## 2022-09-08 PROCEDURE — 75625 CONTRAST EXAM ABDOMINL AORTA: CPT | Performed by: SPECIALIST

## 2022-09-08 PROCEDURE — 63600175 PHARM REV CODE 636 W HCPCS: Performed by: SPECIALIST

## 2022-09-08 PROCEDURE — C1769 GUIDE WIRE: HCPCS | Performed by: SPECIALIST

## 2022-09-08 PROCEDURE — 99153 MOD SED SAME PHYS/QHP EA: CPT | Performed by: SPECIALIST

## 2022-09-08 PROCEDURE — C1894 INTRO/SHEATH, NON-LASER: HCPCS | Performed by: SPECIALIST

## 2022-09-08 PROCEDURE — 85610 PROTHROMBIN TIME: CPT | Performed by: SPECIALIST

## 2022-09-08 PROCEDURE — C1887 CATHETER, GUIDING: HCPCS | Performed by: SPECIALIST

## 2022-09-08 PROCEDURE — 25000003 PHARM REV CODE 250: Performed by: SPECIALIST

## 2022-09-08 PROCEDURE — 80048 BASIC METABOLIC PNL TOTAL CA: CPT | Performed by: SPECIALIST

## 2022-09-08 PROCEDURE — 36415 COLL VENOUS BLD VENIPUNCTURE: CPT | Performed by: SPECIALIST

## 2022-09-08 PROCEDURE — 85730 THROMBOPLASTIN TIME PARTIAL: CPT | Performed by: SPECIALIST

## 2022-09-08 PROCEDURE — 75710 ARTERY X-RAYS ARM/LEG: CPT | Mod: 59 | Performed by: SPECIALIST

## 2022-09-08 PROCEDURE — 25500020 PHARM REV CODE 255: Performed by: SPECIALIST

## 2022-09-08 PROCEDURE — 37229 HC TIB/PER REVASC W/ATHER: CPT | Mod: LT | Performed by: SPECIALIST

## 2022-09-08 RX ORDER — NITROFURANTOIN 25; 75 MG/1; MG/1
100 CAPSULE ORAL NIGHTLY
COMMUNITY

## 2022-09-08 RX ORDER — FENOFIBRATE 48 MG/1
48 TABLET, FILM COATED ORAL NIGHTLY
COMMUNITY

## 2022-09-08 RX ORDER — LIDOCAINE HYDROCHLORIDE 20 MG/ML
INJECTION, SOLUTION INFILTRATION; PERINEURAL
Status: DISCONTINUED | OUTPATIENT
Start: 2022-09-08 | End: 2022-09-08 | Stop reason: HOSPADM

## 2022-09-08 RX ORDER — ENOXAPARIN SODIUM 100 MG/ML
80 INJECTION SUBCUTANEOUS DAILY
COMMUNITY

## 2022-09-08 RX ORDER — CLOPIDOGREL 300 MG/1
TABLET, FILM COATED ORAL
Status: DISCONTINUED | OUTPATIENT
Start: 2022-09-08 | End: 2022-09-08 | Stop reason: HOSPADM

## 2022-09-08 RX ORDER — DOCUSATE SODIUM 100 MG/1
200 CAPSULE, LIQUID FILLED ORAL
COMMUNITY

## 2022-09-08 RX ORDER — CARBIDOPA AND LEVODOPA 50; 200 MG/1; MG/1
1 TABLET, EXTENDED RELEASE ORAL
COMMUNITY

## 2022-09-08 RX ORDER — FUROSEMIDE 40 MG/1
40 TABLET ORAL DAILY PRN
COMMUNITY

## 2022-09-08 RX ORDER — MIDAZOLAM HYDROCHLORIDE 1 MG/ML
INJECTION INTRAMUSCULAR; INTRAVENOUS
Status: DISCONTINUED | OUTPATIENT
Start: 2022-09-08 | End: 2022-09-08 | Stop reason: HOSPADM

## 2022-09-08 RX ORDER — OMEPRAZOLE 20 MG/1
40 CAPSULE, DELAYED RELEASE ORAL
COMMUNITY

## 2022-09-08 RX ORDER — HEPARIN SODIUM 1000 [USP'U]/ML
INJECTION, SOLUTION INTRAVENOUS; SUBCUTANEOUS
Status: DISCONTINUED | OUTPATIENT
Start: 2022-09-08 | End: 2022-09-08 | Stop reason: HOSPADM

## 2022-09-08 RX ORDER — SODIUM CHLORIDE 9 MG/ML
INJECTION, SOLUTION INTRAVENOUS ONCE
Status: COMPLETED | OUTPATIENT
Start: 2022-09-08 | End: 2022-09-08

## 2022-09-08 RX ORDER — PREGABALIN 100 MG/1
100 CAPSULE ORAL 2 TIMES DAILY
COMMUNITY

## 2022-09-08 RX ORDER — FENTANYL CITRATE 50 UG/ML
INJECTION, SOLUTION INTRAMUSCULAR; INTRAVENOUS
Status: DISCONTINUED | OUTPATIENT
Start: 2022-09-08 | End: 2022-09-08 | Stop reason: HOSPADM

## 2022-09-08 RX ORDER — FINASTERIDE 5 MG/1
5 TABLET, FILM COATED ORAL NIGHTLY
COMMUNITY

## 2022-09-08 RX ORDER — CETIRIZINE HYDROCHLORIDE 10 MG/1
10 TABLET ORAL
COMMUNITY

## 2022-09-08 RX ORDER — ACETAMINOPHEN 325 MG/1
650 TABLET ORAL EVERY 4 HOURS PRN
Status: DISCONTINUED | OUTPATIENT
Start: 2022-09-08 | End: 2022-09-08 | Stop reason: HOSPADM

## 2022-09-08 RX ADMIN — SODIUM CHLORIDE: 9 INJECTION, SOLUTION INTRAVENOUS at 05:09

## 2022-09-08 NOTE — DISCHARGE SUMMARY
[facility]  Discharge Note  Short Stay    Procedure(s) (LRB):  AORTOGRAM, WITH RUNOFF (N/A)    OUTCOME: Patient tolerated treatment/procedure well without complication and is now ready for discharge.    DISPOSITION: Home or Self Care    FINAL DIAGNOSIS:  <principal problem not specified>    FOLLOWUP: In clinic in 3 weeks    DISCHARGE INSTRUCTIONS:    Discharge Procedure Orders   Diet Cardiac     Diet general     Lifting restrictions   Order Comments: Avoid heavy lifting. No lifting greater than 10 lbs for 1 week     Remove dressing in 24 hours   Order Comments: Shower daily, do not soak in tub for 1 week     Call MD for:  temperature >100.4     Call MD for:  severe uncontrolled pain     Call MD for:   Order Comments: Bleeding or excessive bruising to groin     Activity as tolerated     Shower on day dressing removed (No bath)        TIME SPENT ON DISCHARGE: 15 minutes

## 2022-09-08 NOTE — Clinical Note
An angiography was performed of the ostial left anterior tibial arterypost intervention  via power injection.

## 2022-09-08 NOTE — OP NOTE
Angiogram Operative Note    Patient Name: Homar David  Age: 81 y.o.  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 He has failed conservative treatment and is a candidate for an elective angiogram with possible intervention    Findings: The right and left renal arteries are patent. The infrarenal aorta is patent. The right common, internal and external iliac arteries are patent. The right common femoral artery is patent. The left common, internal, and external iliac arteries are patent. The left common femoral artery has mild disease. The left profunda femoral artery is patent. The left superficial femoral artery has mild disease. The left popliteal artery is patent. The left anterior tibial is diseased. The left peroneal is patent to the ankle. The left posterior tibial is heavily diseased with reconstitution of flow in the plantar arch.    Procedure in detail: With the patient the table supine position, the right groin was prepped and draped in sterile cell technique.  1% lidocaine was used for local anesthesia. Using ultrasound guidance the right common femoral artery was visualized and viewed as patent; the artery was then accessed using real time ultrasound visualization of vascular needle entry with permanent recording and  reporting. A 4 Frisian sheath was placed.  A flush cath was placed in the aorta and aortogram was obtained.  We then crossed into the contralateral external iliac artery. A lower leg angiogram was obtained.  We identified occlusion/stenosis of the left posterior tibial artery.  We placed a 5 Frisian sheath.  The patient was fully anticoagulated.  We then crossed the lesion with a Glidewire and catheter.  We treated the lesion with a CSI 1.25 atherectomy device on low medium and high settings.  We gave 200 mcg of nitroglycerin in between each run.  We then post dilated the lesion with a 2 x 220 coyote and a 2.5 x 80 coyote  balloon. Excellent results were noted.  No complications were noted.  Injection through the sheath was performed.  Lower leg angiogram was obtained. Sheath to be removed in recovery per protocol.     Recommendations: Successful left posterior tibial artery atherectomy and angioplasty. Continue antiplatelet therapy. Follow up in 3 weeks       Procedure completion assessment left leg: Outflow (AT, PT, Peroneal): 1 vessel    Popliteal segments treated, left leg - if artery treated is popliteal or SFA + popliteal: N/A    TASC Grade for each artery treated (A, B, C, D): C    Total treated length, in cm, for each artery treated: 22    Total occlusion length, in cm, for each artery treated: 0    Calcification for each artery treated (none, focal, mild, moderate, severe): none      Note: I was personally responsible for the administration of moderate sedation services during the procedure performed and I affirm all the guidelines and requirements described in the CPT 2017 section on moderate sedation were followed, including the use of an independently trained observer. The total face-to-face time was 60 minutes.     Sagar Riley  9/8/2022  9:32 AM

## (undated) DEVICE — KIT MINI STK MAX COAX 5FR 10CM

## (undated) DEVICE — CATH 4 FR OMNI FLUSH 65CM

## (undated) DEVICE — GUIDEWIRE VIPERWIRE 14 335 145

## (undated) DEVICE — Device

## (undated) DEVICE — SET ANGIO ACIST CVI ANGIOTOUCH

## (undated) DEVICE — SHEATH FLEXOR ANSEL 5FR 90M

## (undated) DEVICE — CATH COYOTE OTW 2X220X150

## (undated) DEVICE — GUIDEWIRE GLADIUS ES STR 300CM

## (undated) DEVICE — WIRE CHOICE PT X SUPP 014X300

## (undated) DEVICE — GUIDEWIRE EMERALD 3MM 175X5CM

## (undated) DEVICE — CANNULA NASAL ADULT

## (undated) DEVICE — SHEATH PINNACLE INTRO .035 4FR

## (undated) DEVICE — COVER PROBE US 5.5X58L NON LTX

## (undated) DEVICE — DRESSING TRANS 4X4 TEGADERM

## (undated) DEVICE — GUIDEWIRE STF .035X260CM ANG